# Patient Record
Sex: FEMALE | Race: WHITE | NOT HISPANIC OR LATINO | Employment: UNEMPLOYED | URBAN - NONMETROPOLITAN AREA
[De-identification: names, ages, dates, MRNs, and addresses within clinical notes are randomized per-mention and may not be internally consistent; named-entity substitution may affect disease eponyms.]

---

## 2017-01-09 ENCOUNTER — OFFICE VISIT (OUTPATIENT)
Dept: INTERNAL MEDICINE | Facility: CLINIC | Age: 32
End: 2017-01-09

## 2017-01-09 VITALS
OXYGEN SATURATION: 97 % | WEIGHT: 293 LBS | TEMPERATURE: 97.5 F | HEIGHT: 66 IN | SYSTOLIC BLOOD PRESSURE: 124 MMHG | RESPIRATION RATE: 16 BRPM | BODY MASS INDEX: 47.09 KG/M2 | HEART RATE: 84 BPM | DIASTOLIC BLOOD PRESSURE: 84 MMHG

## 2017-01-09 DIAGNOSIS — J06.9 ACUTE URI: Primary | ICD-10-CM

## 2017-01-09 LAB
EXPIRATION DATE: NORMAL
FLUAV AG NPH QL: NORMAL
FLUBV AG NPH QL: NORMAL
INTERNAL CONTROL: NORMAL
Lab: NORMAL

## 2017-01-09 PROCEDURE — 87804 INFLUENZA ASSAY W/OPTIC: CPT | Performed by: PHYSICIAN ASSISTANT

## 2017-01-09 PROCEDURE — 99213 OFFICE O/P EST LOW 20 MIN: CPT | Performed by: PHYSICIAN ASSISTANT

## 2017-01-09 RX ORDER — DEXTROMETHORPHAN HYDROBROMIDE AND PROMETHAZINE HYDROCHLORIDE 15; 6.25 MG/5ML; MG/5ML
SYRUP ORAL
Qty: 180 ML | Refills: 0 | Status: SHIPPED | OUTPATIENT
Start: 2017-01-09 | End: 2017-04-07

## 2017-01-09 NOTE — MR AVS SNAPSHOT
Emily Tracey   1/9/2017 5:15 PM   Office Visit    Provider:  WALI Shelton   Department:  [facility] PRIMARY [facility]ARE   Dept Phone:  [phone]                Your Full Care Plan              Today's Medication Changes          These changes are accurate as of: 1/9/17  5:39 PM.  If you have any questions, ask your nurse or doctor.               New Medication(s)Ordered:     promethazine-dextromethorphan 6.25-15 MG/5ML syrup   Commonly known as:  PROMETHAZINE-DM   Take 7.5 mL by mouth at bedtime as needed for cough.   Started by:  WALI Shelton            Where to Get Your Medications      These medications were sent to [facility]/pharmacy #[id number] - [place], [place] - [address] - [phone]  - [phone]   [address] [address]     Phone:  [phone]     promethazine-dextromethorphan 6.25-15 MG/5ML syrup                  Your Updated Medication List          This list is accurate as of: 1/9/17  5:39 PM.  Always use your most recent med list.                albuterol 108 (90 BASE) MCG/ACT inhaler   Commonly known as:  PROVENTIL HFA;VENTOLIN HFA       cyclobenzaprine 10 MG tablet   Commonly known as:  FLEXERIL       fluticasone-salmeterol 500-50 MCG/DOSE DISKUS   Commonly known as:  ADVAIR       loratadine 10 MG tablet   Commonly known as:  CLARITIN       LORazepam 1 MG tablet   Commonly known as:  ATIVAN       montelukast 10 MG tablet   Commonly known as:  SINGULAIR   Take 1 tablet by mouth Every Night.       ondansetron ODT 8 MG disintegrating tablet   Commonly known as:  ZOFRAN ODT   Take 1 tablet by mouth Every 8 (Eight) Hours As Needed for nausea or vomiting.       promethazine-dextromethorphan 6.25-15 MG/5ML syrup   Commonly known as:  PROMETHAZINE-DM   Take 7.5 mL by mouth at bedtime as needed for cough.               You Were Diagnosed With        Codes Comments    Acute URI    -  Primary ICD-10-CM: J06.9  ICD-9-CM: 465.9        "  Instructions     None    Patient Instructions History      [employer] Signup     Our records indicate that you have an active [employer] account.    You can view your After Visit Summary by going to [email or web address] and logging in with your [employer] username and password.  If you don't have a [employer] username and password but a parent or guardian has access to your record, the parent or guardian should login with their own [employer] username and password and access your record to view the After Visit Summary.    If you have questions, you can email [email or web address] or call [phone] to talk to our [employer] staff.  Remember, [employer] is NOT to be used for urgent needs.  For medical emergencies, dial 911.               Other Info from Your Visit           Allergies     No Known Allergies      Reason for Visit     Sore Throat onset over the wknd.     Cough productive    Chills     Ear Fullness           Vital Signs     Blood Pressure Pulse Temperature Respirations Height Weight    124/84 84 97.5 °F (36.4 °C) 16 66\" (167.6 cm) 310 lb 3 oz (141 kg)    Oxygen Saturation Body Mass Index Smoking Status             97% 50.07 kg/m2 Former Smoker         Problems and Diagnoses Noted     Acute upper respiratory infection    -  Primary      "

## 2017-01-09 NOTE — LETTER
January 9, 2017     Patient: Emily Tracey   YOB: 1985   Date of Visit: 1/9/2017       To Whom It May Concern:    It is my medical opinion that Emily Tracey may return to work on 1/11/2017.           Sincerely,        WALI Shelton    CC: No Recipients

## 2017-01-09 NOTE — PROGRESS NOTES
Emily Tracey is a 31 y.o. female.     Subjective   History of Present Illness   Here today with complaint of 2 days of body aches, feeling feverish, sore throat, cough, ear fullness, headaches and sinus pressure. Taking Dayquil which helped some with congestion but not with sore throat. Reports neck pain the first day but denies pain currently. Has had a little nausea. Cough productive for green sputum.         The following portions of the patient's history were reviewed and updated as appropriate: allergies, current medications, past family history, past medical history, past social history, past surgical history and problem list.    Review of Systems    Constitutional: fever, chills. Negative for appetite change, fatigue and unexpected weight change.   HENT: congestion, ear pain, postnasal drip, rhinorrhea, sore throat.  Negative for hearing loss, nosebleeds, tinnitus and trouble swallowing.    Eyes: Negative for photophobia, discharge and visual disturbance.   Respiratory: Cough.  Negative for chest tightness, shortness of breath and wheezing.    Cardiovascular: Negative for chest pain, palpitations and leg swelling.   Gastrointestinal: Nausea. Negative for abdominal distention, abdominal pain, blood in stool, constipation, diarrhea and vomiting.   Endocrine: Negative for cold intolerance, heat intolerance, polydipsia, polyphagia and polyuria.   Musculoskeletal: Neck pain. Negative for arthralgias, back pain, joint swelling, myalgias and neck stiffness.   Skin: Negative for color change, pallor, rash and wound.   Allergic/Immunologic: Negative for environmental allergies, food allergies and immunocompromised state.   Neurological: HA. Negative for dizziness, tremors, seizures, weakness, numbness.  Hematological: Negative for adenopathy. Does not bruise/bleed easily.   Psychiatric/Behavioral: Negative for agitation, behavioral problems, confusion, hallucinations, self-injury and suicidal ideas. The patient is  "not nervous/anxious.      Objective    Physical Exam  Constitutional: Oriented to person, place, and time. Appears well-developed and well-nourished.   HENT: OP mild erythema. Trace fluid bilateral ears.   Head: No sinus tenderness. Normocephalic and atraumatic.   Eyes: EOM are normal. Pupils are equal, round, and reactive to light.   Neck: Normal range of motion. Neck supple.   Cardiovascular: Normal rate, regular rhythm and normal heart sounds.    Pulmonary/Chest: Coarse breath sounds. Effort normal.  No respiratory distress.  Has no wheezes or rales. Exhibits no chest wall tenderness.   Abdominal: Soft. Bowel sounds are normal. Exhibits no distension and no mass. There is no tenderness.   Musculoskeletal: Normal range of motion. Exhibits no tenderness.   Neurological: Alert and oriented to person, place, and time.   Skin: Skin is warm and dry.   Psychiatric: Has a normal mood and affect. Behavior is normal. Judgment and thought content normal.       Visit Vitals   • /84   • Pulse 84   • Temp 97.5 °F (36.4 °C)   • Resp 16   • Ht 66\" (167.6 cm)   • Wt (!) 310 lb 3 oz (141 kg)   • SpO2 97%   • BMI 50.07 kg/m2       Nursing note and vitals reviewed.          Assessment/Plan   Emily was seen today for sore throat, cough, chills and ear fullness.    Diagnoses and all orders for this visit:    Acute URI  -     promethazine-dextromethorphan (PROMETHAZINE-DM) 6.25-15 MG/5ML syrup; Take 7.5 mL by mouth at bedtime as needed for cough.  Viral. Increase clear fluid intake.     Influenza A&B: negative.      "

## 2017-01-12 ENCOUNTER — TELEPHONE (OUTPATIENT)
Dept: INTERNAL MEDICINE | Facility: CLINIC | Age: 32
End: 2017-01-12

## 2017-01-12 ENCOUNTER — DOCUMENTATION (OUTPATIENT)
Dept: INTERNAL MEDICINE | Facility: CLINIC | Age: 32
End: 2017-01-12

## 2017-01-12 RX ORDER — AZITHROMYCIN 250 MG/1
TABLET, FILM COATED ORAL
Qty: 6 TABLET | Refills: 0 | Status: SHIPPED | OUTPATIENT
Start: 2017-01-12 | End: 2017-04-07

## 2017-01-12 NOTE — TELEPHONE ENCOUNTER
----- Message from Caitlyn Hussein sent at 1/12/2017  8:29 AM EST -----  Contact: Patient   Patient called the office checking the status on her extended excuse. She also was requesting to see if Tahira was wanting to do additional testing for pneumonia due her boyfriend recently being diagnosed with it. She stated that they are having similar symptoms.

## 2017-01-12 NOTE — TELEPHONE ENCOUNTER
----- Message from Shereen Alcantar sent at 1/11/2017  9:59 AM EST -----  Contact: PATIENT  Patient is still sick and is requesting a extension for her work excuse. She is requesting to be excused for Thursday 1/11/17.  Patient works for the [employer] in [place]. Patient would like a return call.

## 2017-01-12 NOTE — TELEPHONE ENCOUNTER
Notified pt. She stated she is a little worse and can't afford to come back in. Wants an antibiotic. Said I can leave a message on her phone if she doesn't answer. The excuse is to return today.

## 2017-04-07 ENCOUNTER — OFFICE VISIT (OUTPATIENT)
Dept: INTERNAL MEDICINE | Facility: CLINIC | Age: 32
End: 2017-04-07

## 2017-04-07 VITALS
WEIGHT: 293 LBS | RESPIRATION RATE: 16 BRPM | OXYGEN SATURATION: 98 % | SYSTOLIC BLOOD PRESSURE: 114 MMHG | HEIGHT: 66 IN | HEART RATE: 82 BPM | DIASTOLIC BLOOD PRESSURE: 80 MMHG | TEMPERATURE: 97.8 F | BODY MASS INDEX: 47.09 KG/M2

## 2017-04-07 DIAGNOSIS — F41.9 ANXIETY: Primary | ICD-10-CM

## 2017-04-07 DIAGNOSIS — R59.0 LYMPHADENOPATHY OF RIGHT CERVICAL REGION: ICD-10-CM

## 2017-04-07 DIAGNOSIS — F32.A DEPRESSION, UNSPECIFIED DEPRESSION TYPE: ICD-10-CM

## 2017-04-07 PROCEDURE — 99214 OFFICE O/P EST MOD 30 MIN: CPT | Performed by: NURSE PRACTITIONER

## 2017-04-07 RX ORDER — HYDROXYZINE PAMOATE 25 MG/1
25 CAPSULE ORAL NIGHTLY PRN
Qty: 14 CAPSULE | Refills: 0 | Status: SHIPPED | OUTPATIENT
Start: 2017-04-07 | End: 2017-04-21

## 2017-04-07 RX ORDER — BUPROPION HYDROCHLORIDE 150 MG/1
150 TABLET ORAL EVERY MORNING
Qty: 30 TABLET | Refills: 0 | Status: SHIPPED | OUTPATIENT
Start: 2017-04-07 | End: 2017-05-07

## 2017-04-07 RX ORDER — AZITHROMYCIN 250 MG/1
TABLET, FILM COATED ORAL
Qty: 6 TABLET | Refills: 0 | Status: SHIPPED | OUTPATIENT
Start: 2017-04-07 | End: 2017-05-26

## 2017-04-11 NOTE — PROGRESS NOTES
Chief Complaint / Reason:      Chief Complaint   Patient presents with   • Neck Pain     on the right side, up into ear. ? fevers. Took a 10 day coarse of Amox. 500 mg that she had at home.         Subjective   Presents today with right side neck pain that radiates up into her ear. She states she took a 10 day course of Amoxicillin 500 mg that she had at home. Fever is unknown but patient reports chills.    Neck Pain    This is a new problem. The current episode started in the past 7 days. The problem occurs constantly. The pain is associated with nothing. The pain is present in the right side. The quality of the pain is described as shooting and aching. The pain is at a severity of 4/10. The pain is mild. The symptoms are aggravated by swallowing and position. The pain is same all the time. Associated symptoms include a fever, headaches and pain with swallowing. Pertinent negatives include no numbness or visual change. Treatments tried: oral antibiotics. The treatment provided no relief.       History taken from: patient    PMH/FH/Social History were reviewed and updated appropriately in the electronic medical record.     Review of Systems:   Review of Systems   Constitutional: Positive for fever.   HENT: Positive for ear pain.    Respiratory: Negative.    Cardiovascular: Negative.    Gastrointestinal: Negative.    Musculoskeletal: Positive for neck pain.   Neurological: Positive for headaches. Negative for numbness.   Hematological: Positive for adenopathy.     All other systems were reviewed and are negative.  Exceptions are noted in the subjective or above.      Objective     Vital Signs  Vitals:    04/07/17 1543   BP: 114/80   Pulse: 82   Resp: 16   Temp: 97.8 °F (36.6 °C)   SpO2: 98%       Body mass index is 49.58 kg/(m^2).    Physical Exam   Constitutional: She is oriented to person, place, and time. She appears well-developed and well-nourished.   HENT:   Nose: Nose normal.   Mouth/Throat: Mucous membranes  are dry. Posterior oropharyngeal erythema present.   Neck: Normal range of motion.   Cardiovascular: Normal rate, regular rhythm, normal heart sounds and intact distal pulses.    Pulmonary/Chest: Effort normal and breath sounds normal.   Abdominal: Soft. Bowel sounds are normal.   Lymphadenopathy:     She has cervical adenopathy.        Right cervical: Superficial cervical adenopathy present.        Left cervical: Superficial cervical and posterior cervical adenopathy present.   Neurological: She is alert and oriented to person, place, and time.   Skin: Skin is warm and dry.   Nursing note and vitals reviewed.     Medication Review:     Current Outpatient Prescriptions:   •  albuterol (PROVENTIL HFA;VENTOLIN HFA) 108 (90 BASE) MCG/ACT inhaler, 2 puffs 4 (four) times a day as needed., Disp: , Rfl:   •  fluticasone-salmeterol (ADVAIR) 500-50 MCG/DOSE DISKUS, 1 puff 2 (two) times a day., Disp: , Rfl:   •  loratadine (CLARITIN) 10 MG tablet, Take  by mouth as needed., Disp: , Rfl:   •  LORazepam (ATIVAN) 1 MG tablet, Take  by mouth as needed., Disp: , Rfl:   •  montelukast (SINGULAIR) 10 MG tablet, Take 1 tablet by mouth Every Night., Disp: 30 tablet, Rfl: 5  •  ondansetron ODT (ZOFRAN ODT) 8 MG disintegrating tablet, Take 1 tablet by mouth Every 8 (Eight) Hours As Needed for nausea or vomiting., Disp: 12 tablet, Rfl: 2  •  azithromycin (ZITHROMAX Z-LILIAM) 250 MG tablet, Take 2 tablets the first day, then 1 tablet daily for 4 days., Disp: 6 tablet, Rfl: 0  •  buPROPion XL (WELLBUTRIN XL) 150 MG 24 hr tablet, Take 1 tablet by mouth Every Morning for 30 days., Disp: 30 tablet, Rfl: 0  •  hydrOXYzine (VISTARIL) 25 MG capsule, Take 1 capsule by mouth At Night As Needed for Anxiety for up to 14 days., Disp: 14 capsule, Rfl: 0    Assessment/Plan   Emily was seen today for neck pain.    Diagnoses and all orders for this visit:    Anxiety  -     hydrOXYzine (VISTARIL) 25 MG capsule; Take 1 capsule by mouth At Night As Needed for  Anxiety for up to 14 days.    Depression, unspecified depression type  -     buPROPion XL (WELLBUTRIN XL) 150 MG 24 hr tablet; Take 1 tablet by mouth Every Morning for 30 days.    Lymphadenopathy of right cervical region  -     azithromycin (ZITHROMAX Z-LILIAM) 250 MG tablet; Take 2 tablets the first day, then 1 tablet daily for 4 days.  Increase fluid intake and rest.  Take medications as prescribed and finish antibiotics.  Eat well balanced diet and exercise daily.   Good oral and hand hygiene.   Follow up in 4 weeks or sooner if symptoms worsen.    Kelly Dos Santos, GAYATHRI  04/07/2017

## 2017-04-14 PROBLEM — F41.0 PANIC ATTACKS: Status: ACTIVE | Noted: 2017-04-14

## 2017-04-14 PROBLEM — G43.909 MIGRAINE: Status: ACTIVE | Noted: 2017-04-14

## 2017-04-14 PROBLEM — N83.209 OVARIAN CYST: Status: ACTIVE | Noted: 2017-04-14

## 2017-04-14 PROBLEM — J45.909 ASTHMA: Status: ACTIVE | Noted: 2017-04-14

## 2017-05-19 ENCOUNTER — TELEPHONE (OUTPATIENT)
Dept: INTERNAL MEDICINE | Facility: CLINIC | Age: 32
End: 2017-05-19

## 2017-05-26 ENCOUNTER — OFFICE VISIT (OUTPATIENT)
Dept: INTERNAL MEDICINE | Facility: CLINIC | Age: 32
End: 2017-05-26

## 2017-05-26 VITALS
HEIGHT: 66 IN | OXYGEN SATURATION: 98 % | DIASTOLIC BLOOD PRESSURE: 86 MMHG | HEART RATE: 82 BPM | SYSTOLIC BLOOD PRESSURE: 122 MMHG | BODY MASS INDEX: 47.09 KG/M2 | RESPIRATION RATE: 16 BRPM | WEIGHT: 293 LBS | TEMPERATURE: 98.4 F

## 2017-05-26 DIAGNOSIS — F41.9 ANXIETY: ICD-10-CM

## 2017-05-26 DIAGNOSIS — R10.12 LEFT UPPER QUADRANT PAIN: Primary | ICD-10-CM

## 2017-05-26 DIAGNOSIS — F32.A DEPRESSION, UNSPECIFIED DEPRESSION TYPE: ICD-10-CM

## 2017-05-26 PROCEDURE — 99213 OFFICE O/P EST LOW 20 MIN: CPT | Performed by: NURSE PRACTITIONER

## 2017-05-26 RX ORDER — AMOXICILLIN AND CLAVULANATE POTASSIUM 500; 125 MG/1; MG/1
TABLET, FILM COATED ORAL 2 TIMES DAILY
COMMUNITY
Start: 2017-05-23 | End: 2019-11-11

## 2017-05-26 RX ORDER — BUPROPION HYDROCHLORIDE 150 MG/1
150 TABLET ORAL DAILY
Qty: 30 TABLET | Refills: 0 | Status: SHIPPED | OUTPATIENT
Start: 2017-05-26 | End: 2017-06-21 | Stop reason: SDUPTHER

## 2017-05-26 RX ORDER — LORAZEPAM 1 MG/1
TABLET ORAL
Qty: 30 TABLET | Refills: 0 | Status: SHIPPED | OUTPATIENT
Start: 2017-05-26 | End: 2019-12-12

## 2017-05-26 RX ORDER — HYDROXYZINE HYDROCHLORIDE 25 MG/1
25 TABLET, FILM COATED ORAL
COMMUNITY
End: 2017-05-26

## 2017-06-01 NOTE — TELEPHONE ENCOUNTER
Pharmacist from [facility] called and asked for Karissa's LORRAINE for the Ativan script. I told him that she does not have one, to put in Dr. Chavez's name.

## 2017-06-21 ENCOUNTER — TELEPHONE (OUTPATIENT)
Dept: INTERNAL MEDICINE | Facility: CLINIC | Age: 32
End: 2017-06-21

## 2017-06-21 DIAGNOSIS — F32.A DEPRESSION, UNSPECIFIED DEPRESSION TYPE: ICD-10-CM

## 2017-06-21 RX ORDER — BUPROPION HYDROCHLORIDE 150 MG/1
150 TABLET ORAL DAILY
Qty: 30 TABLET | Refills: 0 | Status: SHIPPED | OUTPATIENT
Start: 2017-06-21 | End: 2017-07-21

## 2017-06-21 NOTE — TELEPHONE ENCOUNTER
Patient cancelled appt on 6/23/2017 via Tele[employer] because she is between jobs, without insurance. She has rescheduled for 7/24/2017, hoping that insurance will be effective then. However, she will be out of the Wellbutrin and is requesting a refill.

## 2021-12-09 ENCOUNTER — HOSPITAL ENCOUNTER (EMERGENCY)
Facility: HOSPITAL | Age: 36
Discharge: HOME | End: 2021-12-09
Attending: EMERGENCY MEDICINE | Admitting: EMERGENCY MEDICINE

## 2021-12-09 VITALS
WEIGHT: 270 LBS | TEMPERATURE: 98.7 F | HEIGHT: 66 IN | OXYGEN SATURATION: 99 % | DIASTOLIC BLOOD PRESSURE: 92 MMHG | BODY MASS INDEX: 43.39 KG/M2 | HEART RATE: 80 BPM | RESPIRATION RATE: 16 BRPM | SYSTOLIC BLOOD PRESSURE: 137 MMHG

## 2021-12-09 DIAGNOSIS — R51.9 NONINTRACTABLE HEADACHE, UNSPECIFIED CHRONICITY PATTERN, UNSPECIFIED HEADACHE TYPE: Primary | ICD-10-CM

## 2021-12-09 PROCEDURE — 96375 TX/PRO/DX INJ NEW DRUG ADDON: CPT

## 2021-12-09 PROCEDURE — 25010000002 PROCHLORPERAZINE 10 MG/2ML SOLUTION: Performed by: PHYSICIAN ASSISTANT

## 2021-12-09 PROCEDURE — 96374 THER/PROPH/DIAG INJ IV PUSH: CPT

## 2021-12-09 PROCEDURE — 25010000002 DIPHENHYDRAMINE PER 50 MG: Performed by: PHYSICIAN ASSISTANT

## 2021-12-09 PROCEDURE — 99283 EMERGENCY DEPT VISIT LOW MDM: CPT

## 2021-12-09 PROCEDURE — 25010000002 KETOROLAC TROMETHAMINE PER 15 MG: Performed by: PHYSICIAN ASSISTANT

## 2021-12-09 RX ORDER — SODIUM CHLORIDE 0.9 % (FLUSH) 0.9 %
10 SYRINGE (ML) INJECTION AS NEEDED
Status: DISCONTINUED | OUTPATIENT
Start: 2021-12-09 | End: 2021-12-09 | Stop reason: HOSPADM

## 2021-12-09 RX ORDER — KETOROLAC TROMETHAMINE 30 MG/ML
30 INJECTION, SOLUTION INTRAMUSCULAR; INTRAVENOUS ONCE
Status: COMPLETED | OUTPATIENT
Start: 2021-12-09 | End: 2021-12-09

## 2021-12-09 RX ORDER — BUTALBITAL, ACETAMINOPHEN AND CAFFEINE 50; 325; 40 MG/1; MG/1; MG/1
2 TABLET ORAL ONCE
Status: COMPLETED | OUTPATIENT
Start: 2021-12-09 | End: 2021-12-09

## 2021-12-09 RX ORDER — PROCHLORPERAZINE EDISYLATE 5 MG/ML
10 INJECTION INTRAMUSCULAR; INTRAVENOUS ONCE
Status: COMPLETED | OUTPATIENT
Start: 2021-12-09 | End: 2021-12-09

## 2021-12-09 RX ORDER — DIPHENHYDRAMINE HYDROCHLORIDE 50 MG/ML
25 INJECTION INTRAMUSCULAR; INTRAVENOUS ONCE
Status: COMPLETED | OUTPATIENT
Start: 2021-12-09 | End: 2021-12-09

## 2021-12-09 RX ADMIN — DIPHENHYDRAMINE HYDROCHLORIDE 25 MG: 50 INJECTION INTRAMUSCULAR; INTRAVENOUS at 15:41

## 2021-12-09 RX ADMIN — PROCHLORPERAZINE EDISYLATE 10 MG: 5 INJECTION INTRAMUSCULAR; INTRAVENOUS at 15:42

## 2021-12-09 RX ADMIN — SODIUM CHLORIDE 1000 ML: 9 INJECTION, SOLUTION INTRAVENOUS at 15:40

## 2021-12-09 RX ADMIN — BUTALBITAL, ACETAMINOPHEN AND CAFFEINE 2 TABLET: 50; 325; 40 TABLET ORAL at 17:50

## 2021-12-09 RX ADMIN — KETOROLAC TROMETHAMINE 30 MG: 30 INJECTION, SOLUTION INTRAMUSCULAR; INTRAVENOUS at 15:40

## 2021-12-09 NOTE — ED PROVIDER NOTES
Subjective   Chief Complaint: Headache  History of Present Illness: 36-year-old female comes in for evaluation above complaint.  She states she has history of migraines.  She tried NyQuil and Tylenol without relief.  Restart upon awakening this morning around 7:00.  She is on day 8 or 9 of 7 URI symptoms to include sinus congestion fatigue and also some constipation.  She was seen by urgent treatment a day or 2 ago and had a negative flu and Covid swab was put on amoxicillin.  No vomiting but mild nausea.  She states her chief complaint only reason she is here today is for treatment of her headache  Onset: This morning upon awakening around 6 or 7:00  Timing: Ongoing  Exacerbating / Alleviating factors: Worse with bright lights  Associated symptoms: None      Nurses Notes reviewed and agree, including vitals, allergies, social history and prior medical history.          Review of Systems   Constitutional: Positive for fatigue.   HENT: Positive for congestion.    Eyes: Negative.    Respiratory: Negative.  Negative for cough.    Cardiovascular: Negative.    Gastrointestinal: Positive for constipation.   Genitourinary: Negative.    Musculoskeletal: Negative.    Skin: Negative.    Neurological: Positive for headaches.   Psychiatric/Behavioral: Negative.        Past Medical History:   Diagnosis Date   • Abdominal pain    • Anxiety    • Asthma    • Backache    • Blood in stool    • Depression 2001   • Diverticulitis of colon    • Edema    • Kidney infection    • Low back pain    • LUQ pain    • Migraine headache    • Ovarian cyst    • Pain and swelling of right lower leg    • Panic attacks    • Sacroiliac joint pain    • Suspicious nevus    • Tattoo    • Vaginitis        No Known Allergies    Past Surgical History:   Procedure Laterality Date   • COLONOSCOPY     • DENTAL PROCEDURE     • OTHER SURGICAL HISTORY  2001    Laparoscopic excision uterine surface endometriotic tissue       Family History   Problem Relation Age of  Onset   • Heart attack Other         Grandparent   • Arthritis Other         Grandparent   • Cancer Other         Grandparent   • Diabetes Other         Grandparent   • Migraines Other         Grandparent   • Osteoporosis Other    • Colon cancer Neg Hx        Social History     Socioeconomic History   • Marital status: Single   Tobacco Use   • Smoking status: Former Smoker   • Smokeless tobacco: Never Used   • Tobacco comment: Quit in 2013   Substance and Sexual Activity   • Alcohol use: Yes     Comment: occas.   • Drug use: No   • Sexual activity: Defer           Objective   Physical Exam  Vitals and nursing note reviewed.   Constitutional:       General: She is not in acute distress.     Appearance: Normal appearance. She is obese. She is not ill-appearing, toxic-appearing or diaphoretic.   HENT:      Head: Normocephalic and atraumatic.      Nose: Nose normal.   Eyes:      Extraocular Movements: Extraocular movements intact.   Cardiovascular:      Rate and Rhythm: Normal rate and regular rhythm.      Pulses: Normal pulses.   Pulmonary:      Effort: Pulmonary effort is normal.   Abdominal:      General: Abdomen is flat.   Musculoskeletal:         General: Normal range of motion.      Cervical back: Normal range of motion. No rigidity.   Skin:     General: Skin is warm and dry.   Neurological:      General: No focal deficit present.      Mental Status: She is alert. Mental status is at baseline.   Psychiatric:         Mood and Affect: Mood normal.         Behavior: Behavior normal.         Procedures           ED Course                                                 MDM  Patient states headache is much improved.  Upon arrival she states it was greater than a 10 out of 10, currently it is a 5 out of 10.  Final diagnoses:   Nonintractable headache, unspecified chronicity pattern, unspecified headache type       ED Disposition  ED Disposition     ED Disposition Condition Comment    Discharge Stable           Your  Family Doctor      As needed    [facility] Emergency Department  [address] [address] [phone]    If symptoms worsen         Medication List      No changes were made to your prescriptions during this visit.          Lavelle Lara PA-C  12/09/21 [id number]

## 2022-05-02 ENCOUNTER — TRANSCRIBE ORDERS (OUTPATIENT)
Dept: ADMINISTRATIVE | Facility: HOSPITAL | Age: 37
End: 2022-05-02

## 2022-05-02 DIAGNOSIS — E04.1 LEFT THYROID NODULE: Primary | ICD-10-CM

## 2022-05-18 ENCOUNTER — HOSPITAL ENCOUNTER (OUTPATIENT)
Dept: ULTRASOUND IMAGING | Facility: HOSPITAL | Age: 37
Discharge: HOME | End: 2022-05-18
Admitting: NURSE PRACTITIONER

## 2022-05-18 DIAGNOSIS — E04.1 LEFT THYROID NODULE: ICD-10-CM

## 2022-05-18 PROCEDURE — 76536 US EXAM OF HEAD AND NECK: CPT

## 2022-05-23 ENCOUNTER — HOSPITAL ENCOUNTER (EMERGENCY)
Facility: HOSPITAL | Age: 37
Discharge: LEFT BEFORE DISCHARGE | End: 2022-05-23

## 2022-05-23 VITALS
DIASTOLIC BLOOD PRESSURE: 99 MMHG | HEART RATE: 80 BPM | BODY MASS INDEX: 42.43 KG/M2 | TEMPERATURE: 97.9 F | RESPIRATION RATE: 16 BRPM | HEIGHT: 66 IN | OXYGEN SATURATION: 98 % | WEIGHT: 264 LBS | SYSTOLIC BLOOD PRESSURE: 153 MMHG

## 2022-05-23 PROCEDURE — 99211 OFF/OP EST MAY X REQ PHY/QHP: CPT

## 2022-05-31 ENCOUNTER — OFFICE VISIT (OUTPATIENT)
Dept: GASTROENTEROLOGY | Facility: CLINIC | Age: 37
End: 2022-05-31

## 2022-05-31 VITALS
DIASTOLIC BLOOD PRESSURE: 80 MMHG | HEART RATE: 66 BPM | WEIGHT: 262.6 LBS | SYSTOLIC BLOOD PRESSURE: 128 MMHG | OXYGEN SATURATION: 97 % | TEMPERATURE: 95.5 F | HEIGHT: 66 IN | BODY MASS INDEX: 42.2 KG/M2

## 2022-05-31 DIAGNOSIS — Z01.812 PRE-PROCEDURE LAB EXAM: ICD-10-CM

## 2022-05-31 DIAGNOSIS — R13.19 ESOPHAGEAL DYSPHAGIA: ICD-10-CM

## 2022-05-31 DIAGNOSIS — K31.84 GASTROPARESIS: ICD-10-CM

## 2022-05-31 DIAGNOSIS — H35.012 RETINAL MACROANEURYSM, LEFT EYE: Primary | ICD-10-CM

## 2022-05-31 DIAGNOSIS — R19.4 CHANGE IN BOWEL HABITS: ICD-10-CM

## 2022-05-31 DIAGNOSIS — K21.9 GASTROESOPHAGEAL REFLUX DISEASE, UNSPECIFIED WHETHER ESOPHAGITIS PRESENT: ICD-10-CM

## 2022-05-31 DIAGNOSIS — R63.4 WEIGHT LOSS, ABNORMAL: ICD-10-CM

## 2022-05-31 DIAGNOSIS — R10.12 LUQ ABDOMINAL PAIN: Primary | ICD-10-CM

## 2022-05-31 DIAGNOSIS — R11.0 NAUSEA: ICD-10-CM

## 2022-05-31 DIAGNOSIS — K62.5 RECTAL BLEEDING: ICD-10-CM

## 2022-05-31 PROCEDURE — 99244 OFF/OP CNSLTJ NEW/EST MOD 40: CPT | Performed by: PHYSICIAN ASSISTANT

## 2022-05-31 RX ORDER — ONDANSETRON 4 MG/1
4 TABLET, ORALLY DISINTEGRATING ORAL EVERY 8 HOURS PRN
Qty: 42 TABLET | Refills: 3 | Status: SHIPPED | OUTPATIENT
Start: 2022-05-31 | End: 2023-03-13

## 2022-05-31 RX ORDER — FAMOTIDINE 20 MG/1
20 TABLET, FILM COATED ORAL AS NEEDED
COMMUNITY
End: 2022-06-14 | Stop reason: HOSPADM

## 2022-05-31 RX ORDER — SODIUM CHLORIDE 9 MG/ML
30 INJECTION, SOLUTION INTRAVENOUS CONTINUOUS PRN
Status: CANCELLED | OUTPATIENT
Start: 2022-05-31

## 2022-05-31 RX ORDER — BISACODYL 5 MG
TABLET, DELAYED RELEASE (ENTERIC COATED) ORAL
Qty: 4 TABLET | Refills: 0 | Status: SHIPPED | OUTPATIENT
Start: 2022-05-31 | End: 2022-07-27

## 2022-05-31 RX ORDER — POLYETHYLENE GLYCOL 3350 17 G/17G
POWDER, FOR SOLUTION ORAL
Qty: 238 G | Refills: 0 | Status: SHIPPED | OUTPATIENT
Start: 2022-05-31 | End: 2022-10-20

## 2022-05-31 RX ORDER — CYANOCOBALAMIN 1000 UG/ML
1000 INJECTION, SOLUTION INTRAMUSCULAR; SUBCUTANEOUS WEEKLY
COMMUNITY
End: 2022-10-20

## 2022-05-31 RX ORDER — CYCLOBENZAPRINE HCL 5 MG
TABLET ORAL
COMMUNITY
Start: 2022-04-06 | End: 2022-06-14 | Stop reason: HOSPADM

## 2022-05-31 RX ORDER — ERGOCALCIFEROL 1.25 MG/1
50000 CAPSULE ORAL
COMMUNITY
Start: 2022-05-02 | End: 2022-08-11

## 2022-06-03 ENCOUNTER — TELEPHONE (OUTPATIENT)
Dept: GASTROENTEROLOGY | Facility: CLINIC | Age: 37
End: 2022-06-03

## 2022-06-03 ENCOUNTER — PATIENT ROUNDING (BHMG ONLY) (OUTPATIENT)
Dept: GASTROENTEROLOGY | Facility: CLINIC | Age: 37
End: 2022-06-03

## 2022-06-03 PROBLEM — R13.19 ESOPHAGEAL DYSPHAGIA: Status: ACTIVE | Noted: 2022-06-03

## 2022-06-03 PROBLEM — R19.4 CHANGE IN BOWEL HABITS: Status: ACTIVE | Noted: 2022-06-03

## 2022-06-03 NOTE — TELEPHONE ENCOUNTER
Patient left message with supervisor stating that she had some questions regarding diet and wished to speak to medical assistant. Called patient to discuss. Reached voicemail. Left detailed message for return call.

## 2022-06-03 NOTE — PROGRESS NOTES
Gemma 3, 2022    Hello, may I speak with Emily Tracey?    My name is Irma     I am  with [facility]  [address]  [facility] [address] [phone].    Before we get started may I verify your date of birth? 1985    I am calling to officially welcome you to our practice and ask about your recent visit. Is this a good time to talk? yes    Tell me about your visit with us. What things went well?  everything went like expected       We're always looking for ways to make our patients' experiences even better. Do you have recommendations on ways we may improve?  no    Overall were you satisfied with your first visit to our practice? yes       I appreciate you taking the time to speak with me today. Is there anything else I can do for you? Yes, I have some questions re: diet. I will ask MA to call patient to explain today.      Thank you, and have a great day.

## 2022-06-13 ENCOUNTER — LAB (OUTPATIENT)
Dept: LAB | Facility: HOSPITAL | Age: 37
End: 2022-06-13

## 2022-06-13 LAB — SARS-COV-2 RNA PNL SPEC NAA+PROBE: NOT DETECTED

## 2022-06-13 PROCEDURE — 87635 SARS-COV-2 COVID-19 AMP PRB: CPT

## 2022-06-13 NOTE — PRE-PROCEDURE INSTRUCTIONS
PAT phone history completed with pt for upcoming procedure on  6/14/22 with Dr. Howell.    PAT PASS GIVEN/REVIEWED WITH PT.  VERBALIZED UNDERSTANDING OF THE FOLLOWING:  DO NOT EAT, DRINK, SMOKE, USE SMOKELESS TOBACCO OR CHEW GUM AFTER MIDNIGHT THE NIGHT BEFORE SURGERY.  THIS ALSO INCLUDES HARD CANDIES AND MINTS.    DO NOT SHAVE THE AREA TO BE OPERATED ON AT LEAST 48 HOURS PRIOR TO THE PROCEDURE.  DO NOT WEAR MAKE UP OR NAIL POLISH.  DO NOT LEAVE IN ANY PIERCING OR WEAR JEWELRY THE DAY OF SURGERY.      DO NOT USE ADHESIVES IF YOU WEAR DENTURES.    DO NOT WEAR EYE CONTACTS; BRING IN YOUR GLASSES.    ONLY TAKE MEDICATION THE MORNING OF YOUR PROCEDURE IF INSTRUCTED BY YOUR SURGEON WITH ENOUGH WATER TO SWALLOW THE MEDICATION.  IF YOUR SURGEON DID NOT SPECIFY WHICH MEDICATIONS TO TAKE, YOU WILL NEED TO CALL THEIR OFFICE FOR FURTHER INSTRUCTIONS AND DO AS THEY INSTRUCT.    LEAVE ANYTHING YOU CONSIDER VALUABLE AT HOME.    YOU WILL NEED TO ARRANGE FOR SOMEONE TO DRIVE YOU HOME AFTER SURGERY.  IT IS RECOMMENDED THAT YOU DO NOT DRIVE, WORK, DRINK ALCOHOL OR MAKE MAJOR DECISIONS FOR AT LEAST 24 HOURS AFTER YOUR PROCEDURE IS COMPLETE.      THE DAY OF YOUR PROCEDURE, BRING IN THE FOLLOWING IF APPLICABLE:   PICTURE ID AND INSURANCE/MEDICARE OR MEDICAID CARDS/ANY CO-PAY THAT MAY BE DUE   COPY OF ADVANCED DIRECTIVE/LIVING WILL/POWER OR    CPAP/BIPAP/INHALERS   SKIN PREP SHEET   YOUR PREADMISSION TESTING PASS (IF NOT A PHONE HISTORY)           COVID self-quarantine instructions reviewed with the pt.  Verbalized understanding.

## 2022-06-14 ENCOUNTER — ANESTHESIA EVENT (OUTPATIENT)
Dept: GASTROENTEROLOGY | Facility: HOSPITAL | Age: 37
End: 2022-06-14

## 2022-06-14 ENCOUNTER — HOSPITAL ENCOUNTER (OUTPATIENT)
Facility: HOSPITAL | Age: 37
Setting detail: HOSPITAL OUTPATIENT SURGERY
Discharge: HOME | End: 2022-06-14
Attending: INTERNAL MEDICINE | Admitting: INTERNAL MEDICINE

## 2022-06-14 ENCOUNTER — ANESTHESIA (OUTPATIENT)
Dept: GASTROENTEROLOGY | Facility: HOSPITAL | Age: 37
End: 2022-06-14

## 2022-06-14 VITALS
DIASTOLIC BLOOD PRESSURE: 73 MMHG | RESPIRATION RATE: 18 BRPM | TEMPERATURE: 97.7 F | OXYGEN SATURATION: 96 % | SYSTOLIC BLOOD PRESSURE: 134 MMHG | HEART RATE: 70 BPM

## 2022-06-14 DIAGNOSIS — K21.9 GASTROESOPHAGEAL REFLUX DISEASE, UNSPECIFIED WHETHER ESOPHAGITIS PRESENT: ICD-10-CM

## 2022-06-14 DIAGNOSIS — R13.19 ESOPHAGEAL DYSPHAGIA: ICD-10-CM

## 2022-06-14 LAB
B-HCG UR QL: NEGATIVE
EXPIRATION DATE: NORMAL
INTERNAL NEGATIVE CONTROL: NORMAL
INTERNAL POSITIVE CONTROL: NORMAL
Lab: NORMAL

## 2022-06-14 PROCEDURE — 25010000002 PROPOFOL 10 MG/ML EMULSION: Performed by: NURSE ANESTHETIST, CERTIFIED REGISTERED

## 2022-06-14 PROCEDURE — 43239 EGD BIOPSY SINGLE/MULTIPLE: CPT | Performed by: INTERNAL MEDICINE

## 2022-06-14 PROCEDURE — 25010000002 MIDAZOLAM PER 1MG: Performed by: NURSE ANESTHETIST, CERTIFIED REGISTERED

## 2022-06-14 PROCEDURE — 81025 URINE PREGNANCY TEST: CPT | Performed by: INTERNAL MEDICINE

## 2022-06-14 RX ORDER — LIDOCAINE HYDROCHLORIDE 20 MG/ML
INJECTION, SOLUTION INTRAVENOUS AS NEEDED
Status: DISCONTINUED | OUTPATIENT
Start: 2022-06-14 | End: 2022-06-14 | Stop reason: SURG

## 2022-06-14 RX ORDER — PROPOFOL 10 MG/ML
VIAL (ML) INTRAVENOUS AS NEEDED
Status: DISCONTINUED | OUTPATIENT
Start: 2022-06-14 | End: 2022-06-14 | Stop reason: SURG

## 2022-06-14 RX ORDER — SODIUM CHLORIDE 9 MG/ML
30 INJECTION, SOLUTION INTRAVENOUS CONTINUOUS PRN
Status: DISCONTINUED | OUTPATIENT
Start: 2022-06-14 | End: 2022-06-14 | Stop reason: HOSPADM

## 2022-06-14 RX ORDER — PANTOPRAZOLE SODIUM 20 MG/1
20 TABLET, DELAYED RELEASE ORAL DAILY
Qty: 30 TABLET | Refills: 2 | Status: SHIPPED | OUTPATIENT
Start: 2022-06-14 | End: 2022-07-14

## 2022-06-14 RX ORDER — MIDAZOLAM HYDROCHLORIDE 2 MG/2ML
INJECTION, SOLUTION INTRAMUSCULAR; INTRAVENOUS AS NEEDED
Status: DISCONTINUED | OUTPATIENT
Start: 2022-06-14 | End: 2022-06-14 | Stop reason: SURG

## 2022-06-14 RX ADMIN — PROPOFOL 100 MG: 10 INJECTION, EMULSION INTRAVENOUS at 13:20

## 2022-06-14 RX ADMIN — LIDOCAINE HYDROCHLORIDE 60 MG: 20 INJECTION, SOLUTION INTRAVENOUS at 13:20

## 2022-06-14 RX ADMIN — SODIUM CHLORIDE 30 ML/HR: 9 INJECTION, SOLUTION INTRAVENOUS at 11:47

## 2022-06-14 RX ADMIN — MIDAZOLAM HYDROCHLORIDE 2 MG: 1 INJECTION, SOLUTION INTRAMUSCULAR; INTRAVENOUS at 13:20

## 2022-06-14 RX ADMIN — PROPOFOL 200 MCG/KG/MIN: 10 INJECTION, EMULSION INTRAVENOUS at 13:20

## 2022-06-14 NOTE — ANESTHESIA POSTPROCEDURE EVALUATION
Patient: Emily Tracey    Procedure Summary     Date: 06/14/22 Room / Location: [facility] ENDOSCOPY 2 / [facility] ENDOSCOPY    Anesthesia Start: 1316 Anesthesia Stop: 1338    Procedure: ESOPHAGOGASTRODUODENOSCOPY WITH BIOPSY (N/A Esophagus) Diagnosis:       Esophageal dysphagia      Gastroesophageal reflux disease, unspecified whether esophagitis present      (Esophageal dysphagia [R13.19])      (Gastroesophageal reflux disease, unspecified whether esophagitis present [K21.9])    Surgeons: Johnna Howell MD Provider: Chema Gonzalez CRNA    Anesthesia Type: MAC ASA Status: 3          Anesthesia Type: MAC    Vitals  No vitals data found for the desired time range.          Post Anesthesia Care and Evaluation    Patient location during evaluation: PHASE II  Patient participation: complete - patient participated  Level of consciousness: awake and alert  Pain score: 0  Pain management: satisfactory to patient    Airway patency: patent  Anesthetic complications: No anesthetic complications  PONV Status: none  Cardiovascular status: acceptable and stable  Respiratory status: acceptable  Hydration status: acceptable    Comments: Vitals signs as noted in nursing documentation as per protocol.

## 2022-06-14 NOTE — DISCHARGE INSTRUCTIONS
- Discharge patient to home (ambulatory).   - Low fiber small meals  - Continue present medications.   - Avoid NSAIDS  - longterm PPI  - Dietary changes  and Lifestyle changes including losing weight  - Await pathology results.   - Repeat EGD 2-3 yrs pending path  - Return to my office in 8 months.     Please follow all post op instructions and follow up appointment time from your physician's office included in your discharge packet.    REST TODAY    No pushing, pulling, tugging,  heavy lifting, or strenuous activity.  No major decision making, driving, or drinking alcoholic beverages for 24 hours. ( due to the medications you have  received)  Always use good hand hygiene/washing techniques.  NO driving while taking pain medications.    To assist you in voiding:  Drink plenty of fluids  Listen to running water while attempting to void.    If you are unable to urinate and you have an uncomfortable urge to void or it has been   6 hours since you were discharged, return to the Emergency Room

## 2022-06-14 NOTE — H&P
[facility]  HISTORY AND PHYSICAL    Patient Name: Emily Tracey  : 1985  MRN: [id number]    Chief Complaint:   For EGD    History Of Presenting Illness:    Nausea  LUQ abdominal pain   Change in bowel habit  Wt loss      Past Medical History:   Diagnosis Date   • Abdominal pain    • Anxiety    • Asthma    • Backache    • Blood in stool    • Colon polyp    • COVID-19 vaccine series completed     with booster   • Depression    • Diverticulitis of colon    • Edema    • GERD (gastroesophageal reflux disease)    • Kidney infection    • Low back pain    • LUQ pain    • Migraine headache    • Ovarian cyst    • Pain and swelling of right lower leg    • Panic attacks    • Sacroiliac joint pain    • Suspicious nevus    • Tattoo    • Urgency of urination    • Vaginitis    • Wears glasses        Past Surgical History:   Procedure Laterality Date   • COLONOSCOPY     • OTHER SURGICAL HISTORY      Laparoscopic excision uterine surface endometriotic tissue x2   • WISDOM TOOTH EXTRACTION         Social History     Socioeconomic History   • Marital status:    Tobacco Use   • Smoking status: Former Smoker   • Smokeless tobacco: Never Used   • Tobacco comment: Quit in    Vaping Use   • Vaping Use: Never used   Substance and Sexual Activity   • Alcohol use: Yes     Alcohol/week: 1.0 standard drink     Types: 1 Glasses of wine per week     Comment: occas.   • Drug use: No   • Sexual activity: Defer       Family History   Problem Relation Age of Onset   • Cancer Mother    • COPD Mother    • Heart attack Father    • Esophageal cancer Maternal Uncle    • Heart attack Other         Grandparent   • Arthritis Other         Grandparent   • Cancer Other         Grandparent   • Diabetes Other         Grandparent   • Migraines Other         Grandparent   • Osteoporosis Other    • Colon cancer Neg Hx        Prior to Admission Medications:  Medications Prior to Admission   Medication Sig Dispense Refill  Last Dose   • acetaminophen (TYLENOL) 500 MG tablet Take 500 mg by mouth Every 6 (Six) Hours As Needed for Mild Pain .   6/14/2022 at 0100   • albuterol (PROVENTIL HFA;VENTOLIN HFA) 108 (90 BASE) MCG/ACT inhaler 2 puffs 4 (four) times a day as needed.   Past Week at Unknown time   • cyanocobalamin 1000 MCG/ML injection Inject 1,000 mcg into the appropriate muscle as directed by prescriber 1 (One) Time Per Week. Every other week   Past Week at Unknown time   • diphenhydrAMINE (BENADRYL) 25 MG tablet Take 25 mg by mouth Every 6 (Six) Hours As Needed for Itching.   Past Month at Unknown time   • famotidine (PEPCID) 20 MG tablet Take 20 mg by mouth As Needed for Heartburn.   6/13/2022 at 0900   • fluticasone-salmeterol (ADVAIR) 500-50 MCG/DOSE DISKUS 1 puff 2 (two) times a day.   Past Week at Unknown time   • ondansetron ODT (ZOFRAN-ODT) 4 MG disintegrating tablet Place 1 tablet on the tongue Every 8 (Eight) Hours As Needed for Nausea or Vomiting. 42 tablet 3 Past Week at Unknown time   • vitamin D (ERGOCALCIFEROL) 1.25 MG (69737 UT) capsule capsule Take 50,000 Units by mouth Every 7 (Seven) Days.     Past Week at Unknown time   • bisacodyl (Dulcolax) 5 MG EC tablet Follow instructions given at office 4 tablet 0    • cyclobenzaprine (FLEXERIL) 5 MG tablet TAKE 1 TABLET BY MOUTH THREE TIMES A DAY AS NEEDED FOR 30 DAYS (Patient not taking: No sig reported)   Not Taking at Unknown time   • polyethylene glycol (MIRALAX) 17 GM/SCOOP powder Follow directions given at office 238 g 0        Allergies:  Allergies   Allergen Reactions   • Hydroxyzine Hives   • Lamotrigine Rash        Vitals: Temp:  [97.7 °F (36.5 °C)] 97.7 °F (36.5 °C)  Heart Rate:  [73] 73  Resp:  [18] 18  BP: (146)/(100) 146/100    Review Of Systems:  Constitutional:  Negative for chills, fever, and unexpected weight change.  Respiratory:  Negative for cough, chest tightness, shortness of breath, and wheezing.  Cardiovascular:  Negative for chest pain,  palpitations, and leg swelling.  Gastrointestinal:  Negative for abdominal distention, abdominal pain, Nausea, vomiting.  Neurological:  Negative for Weakness, numbness, and headaches.     Physical Exam:    General Appearance:  Alert, cooperative, in no acute distress.   Lungs:   Clear to auscultation, respirations regular, even and                 unlabored.   Heart:  Regular rhythm and normal rate.   Abdomen:   Normal bowel sounds, no masses, no organomegaly. Soft, non-tender, non-distended   Neurologic: Alert and oriented x 3. Moves all four limbs equally       Plan: ESOPHAGOGASTRODUODENOSCOPY WITH DILATATION CPT CODE: 31074 (N/A)     Johnna Howell MD  6/14/2022

## 2022-06-14 NOTE — ANESTHESIA PREPROCEDURE EVALUATION
Anesthesia Evaluation     Patient summary reviewed and Nursing notes reviewed   no history of anesthetic complications:  NPO Solid Status: > 8 hours  NPO Liquid Status: > 8 hours           Airway   Mallampati: I  TM distance: >3 FB  Neck ROM: full  no difficulty expected  Dental - normal exam     Pulmonary - normal exam   (+) asthma,  Cardiovascular - negative cardio ROS and normal exam        Neuro/Psych  (+) headaches, numbness, psychiatric history,    GI/Hepatic/Renal/Endo    (+)  GERD,  renal disease,     Musculoskeletal (-) negative ROS    Abdominal    Substance History - negative use     OB/GYN negative ob/gyn ROS         Other - negative ROS                       Anesthesia Plan    ASA 3     MAC     intravenous induction     Anesthetic plan, risks, benefits, and alternatives have been provided, discussed and informed consent has been obtained with: patient.        CODE STATUS:

## 2022-06-16 LAB — REF LAB TEST METHOD: NORMAL

## 2022-07-15 DIAGNOSIS — N93.9 VAGINAL BLEEDING: Primary | ICD-10-CM

## 2022-07-15 LAB
B-HCG UR QL: NEGATIVE
EXPIRATION DATE: NORMAL
INTERNAL NEGATIVE CONTROL: NEGATIVE
INTERNAL POSITIVE CONTROL: POSITIVE
Lab: NORMAL

## 2022-07-15 PROCEDURE — 81025 URINE PREGNANCY TEST: CPT | Performed by: OBSTETRICS & GYNECOLOGY

## 2022-07-27 ENCOUNTER — OFFICE VISIT (OUTPATIENT)
Dept: OBSTETRICS AND GYNECOLOGY | Facility: CLINIC | Age: 37
End: 2022-07-27

## 2022-07-27 VITALS
DIASTOLIC BLOOD PRESSURE: 82 MMHG | HEIGHT: 66 IN | BODY MASS INDEX: 42.11 KG/M2 | WEIGHT: 262 LBS | SYSTOLIC BLOOD PRESSURE: 128 MMHG

## 2022-07-27 DIAGNOSIS — N92.1 MENORRHAGIA WITH IRREGULAR CYCLE: Primary | ICD-10-CM

## 2022-07-27 DIAGNOSIS — N95.1 MENOPAUSAL SYMPTOMS: ICD-10-CM

## 2022-07-27 DIAGNOSIS — Z87.42 PERSONAL HISTORY OF ENDOMETRIOSIS: ICD-10-CM

## 2022-07-27 DIAGNOSIS — N94.10 DYSPAREUNIA IN FEMALE: ICD-10-CM

## 2022-07-27 DIAGNOSIS — R10.2 PELVIC PAIN: ICD-10-CM

## 2022-07-27 DIAGNOSIS — N83.202 LEFT OVARIAN CYST: ICD-10-CM

## 2022-07-27 DIAGNOSIS — N94.6 DYSMENORRHEA: ICD-10-CM

## 2022-07-27 PROCEDURE — 99204 OFFICE O/P NEW MOD 45 MIN: CPT | Performed by: OBSTETRICS & GYNECOLOGY

## 2022-07-29 NOTE — PROGRESS NOTES
Chief Complaint  Menorrhagia (C/o of irregular periods, decidual cast, heavy bleeding , twitching uterus )     History of Present Illness:  Patient is 37 y.o. No obstetric history on file. who presents to [facility] here as a new patient for evaluation of her heavy and irregular menstrual cycles.  Patient reports her menstrual cycles have been irregular over the last several months.  Patient reports in May she was 1 week late on her menstrual cycle.  Patient started spotting on the 20th.  She reports her bleeding became heavier on the 23rd.  Patient was seen in the emergency room on the 24th.  Patient reports passing tissue which appeared to be a decidual cast.  Patient has a picture on her phone.  Patient was seen in the emergency room.  Patient did have labs as well.  Patient reports bleeding again on June 19.  Patient reports normally her menstrual cycles will last for a week.  Patient had a transvaginal ultrasound on July 15.  Those images are reviewed today with the patient.  She reports bleeding again on July 20 through 25.  Patient has been having pelvic pain as well as dysmenorrhea.  She also reports having pain with intercourse.  Patient has been having menopausal symptoms as well.  She reports her mother went through menopause at the age of 40.  Patient does give a history of having endometriosis as well.  Patient is not on any contraception.  Patient does not want to be on any contraception.  Patient is desiring conception.  Patient does report having recent labs with her primary care provider.  She reports her B12 and vitamin D were low.    History  Past Medical History:   Diagnosis Date   • Abdominal pain    • Anxiety    • Asthma    • Backache    • Blood in stool    • Colon polyp    • COVID-19 vaccine series completed     with booster   • Depression 2001   • Diverticulitis of colon    • Edema    • Endometriosis    • GERD (gastroesophageal reflux disease)    • Kidney infection     • Low back pain    • LUQ pain    • Migraine headache    • Ovarian cyst    • Pain and swelling of right lower leg    • Panic attacks    • Sacroiliac joint pain    • Suspicious nevus    • Tattoo    • Urgency of urination    • Vaginitis    • Wears glasses      Current Outpatient Medications on File Prior to Visit   Medication Sig Dispense Refill   • albuterol (PROVENTIL HFA;VENTOLIN HFA) 108 (90 BASE) MCG/ACT inhaler 2 puffs 4 (four) times a day as needed.     • cyanocobalamin 1000 MCG/ML injection Inject 1,000 mcg into the appropriate muscle as directed by prescriber 1 (One) Time Per Week. Every other week     • fluticasone-salmeterol (ADVAIR) 500-50 MCG/DOSE DISKUS 1 puff 2 (two) times a day.     • ondansetron ODT (ZOFRAN-ODT) 4 MG disintegrating tablet Place 1 tablet on the tongue Every 8 (Eight) Hours As Needed for Nausea or Vomiting. 42 tablet 3   • polyethylene glycol (MIRALAX) 17 GM/SCOOP powder Follow directions given at office 238 g 0   • vitamin D (ERGOCALCIFEROL) 1.25 MG (54359 UT) capsule capsule Take 50,000 Units by mouth Every 7 (Seven) Days.         No current facility-administered medications on file prior to visit.     Allergies   Allergen Reactions   • Hydroxyzine Hives   • Lamotrigine Rash     Past Surgical History:   Procedure Laterality Date   • COLONOSCOPY     • ENDOSCOPY N/A 6/14/2022    Procedure: ESOPHAGOGASTRODUODENOSCOPY WITH BIOPSY;  Surgeon: Johnna Howell MD;  Location: [facility] ENDOSCOPY;  Service: Gastroenterology;  Laterality: N/A;   • OTHER SURGICAL HISTORY  2001    Laparoscopic excision uterine surface endometriotic tissue x2   • WISDOM TOOTH EXTRACTION       Family History   Problem Relation Age of Onset   • Cancer Mother    • COPD Mother    • Heart attack Father    • Esophageal cancer Maternal Uncle    • Heart attack Other         Grandparent   • Arthritis Other         Grandparent   • Cancer Other         Grandparent   • Diabetes Other         Grandparent   • Migraines  "Other         Grandparent   • Osteoporosis Other    • Colon cancer Neg Hx      Social History     Socioeconomic History   • Marital status:    Tobacco Use   • Smoking status: Former Smoker   • Smokeless tobacco: Never Used   • Tobacco comment: Quit in 2013   Vaping Use   • Vaping Use: Never used   Substance and Sexual Activity   • Alcohol use: Yes     Alcohol/week: 1.0 standard drink     Types: 1 Glasses of wine per week     Comment: occas.   • Drug use: No   • Sexual activity: Defer       Physical Examination:  Vital Signs: /82   Ht 167.6 cm (66\")   Wt 119 kg (262 lb)   BMI 42.29 kg/m²     General Appearance: alert, appears stated age, and cooperative  Breasts: Not performed.  Abdomen: no masses, no hepatomegaly, no splenomegaly, soft non-tender, no guarding, and no rebound tenderness  Pelvic: Not performed.    Data Review:  The following data was reviewed by: Gail Jenkins MD on 07/27/2022:     Labs:    Imaging:  US Non-ob Transvaginal (07/15/2022 14:13)    Medical Records:  EXTERNAL MEDICAL RECORDS - SCAN - Med REcords, MATTHEW,7/15/2022 (07/15/2022)  Op Note by Camacho Marques MD (06/30/2016 00:00)      Assessment and Plan   1. Menorrhagia with irregular cycle  The patient was informed that menstrual flow outside of normal volume, duration, regularity, or frequency is considered abnormal uterine bleeding.  The patient was informed that the normal duration of menstrual flow is usually 5 days and the normal cycle typically lasts between 21-35 days.  The patient was informed that heavy menstrual bleeding has been defined as blood loss greater than 80 mL and given that this is hard to quantify excessive blood loss is based on the patient's perception. The patient was informed that AUB most frequently occurs in women aged 19-39 years as a result of pregnancy, structural lesions such as polyps or fibroids, anovulatory cycles, use of hormonal contraception, and endometrial hyperplasia.  She was " counseled that endometrial cancer is less common but may occur.  It is recommended that she have a pregnancy test, CBC, and TSH.  Her pap smear needs to be up to date.  She needs screening for Chlamydia if she feels she is at high risk.  It is also recommended that she have a transvaginal ultrasound for further evaluation.  If anatomic abnormalities are noted then then surgery may be indicated. An endometrial ablation may also be an option to control bleeding in women who have completed childbearing.  The various options were discussed regarding medical management of her bleeding to include the use of nonsteroidal antiinflammatory drugs, progestins, combination oral contraceptives, a levonorgestrel intrauterine device, or tranexamic acid. The patient is informed if there is no improvement in her symptoms with medical management then she will need additional evaluation to include additional laboratory assessment and endometrial sampling.  Will obtain labs today as noted.  Patient is designed to obtain a copy of her previous medical records.  Patient will follow-up with transvaginal ultrasound following her menstrual cycle as discussed.  Plan pending results.  - CBC & Differential  - Estradiol  - Follicle Stimulating Hormone  - Testosterone, Free, Total  - TSH  - T4, Free  - T3, Free  - Antimullerian Hormone (AMH); Future  - US Non-ob Transvaginal; Future    2. Dysmenorrhea  Emily Tracey was counseled regarding the various etiologies for dysmenorrhea.  The patient was informed that primary dysmenorrhea is painful menstruation in the absence of pathology.  The various options for dysmenorrhea were discussed to include nonsteroidal antiinflammatory drugs, hormonal suppression, or both.  The patient was informed secondary dysmenorrhea is a result of pelvic pathology and is more common in patients with severe dysmenorrhea at menarche or progressively worsening dysmenorrhea, abnormal uterine bleeding, mid-cycle or  acyclic pain, infertility, family history of endometriosis, dyspareunia, or lack of response to empiric therapy.  Evaluation for secondary causes includes pelvic ultrasonography and possible laparoscopy.  The various treatment options for secondary dysmenorrhea depends upon the etiology as discussed.  - US Non-ob Transvaginal; Future    3. Menopausal symptoms  Will obtain labs today as noted.  Plan pending results.  - Estradiol  - Follicle Stimulating Hormone  - Testosterone, Free, Total  - Antimullerian Hormone (AMH); Future    4. Dyspareunia in female  Patient with dyspareunia as noted.  Patient did have a left ovarian cyst on her recent ultrasound.  Instructions and precautions are given.  Patient is to follow-up as discussed.  - US Non-ob Transvaginal; Future    5. Pelvic pain  Patient with pelvic pain as noted.  Patient did have recent transvaginal ultrasound.  Will obtain labs today as discussed.  Patient is also to sign to obtain her medical records.  Patient will follow up with repeat imaging.  - US Non-ob Transvaginal; Future    6. Personal history of endometriosis  Patient with known history of endometriosis.  Patient will follow-up with repeat imaging.  Patient's operative report has been reviewed as well.  - US Non-ob Transvaginal; Future    7. Left ovarian cyst  Patient with left ovarian cyst.  I discussed with the patient her ultrasound findings.  Instructions and precautions are given.  Patient will follow-up with repeat imaging as discussed.  - US Non-ob Transvaginal; Future    Follow Up/Instructions:  Follow up as noted.  Patient was given instructions and counseling regarding her condition or for health maintenance advice. Please see specific information pulled into the AVS if appropriate.     Note: Speech recognition transcription software may have been used to dictate portions of this document.  An attempt at proofreading has been made though minor errors in transcription may still be  present.    This note was electronically signed.  Gail Jenkins M.D.

## 2022-07-30 LAB
BASOPHILS # BLD AUTO: 0.03 10*3/MM3 (ref 0–0.2)
BASOPHILS NFR BLD AUTO: 0.5 % (ref 0–1.5)
EOSINOPHIL # BLD AUTO: 0.27 10*3/MM3 (ref 0–0.4)
EOSINOPHIL NFR BLD AUTO: 4.6 % (ref 0.3–6.2)
ERYTHROCYTE [DISTWIDTH] IN BLOOD BY AUTOMATED COUNT: 12.2 % (ref 12.3–15.4)
ESTRADIOL SERPL-MCNC: 69.7 PG/ML
FSH SERPL-ACNC: 14.5 MIU/ML
HCT VFR BLD AUTO: 40.6 % (ref 34–46.6)
HGB BLD-MCNC: 13.4 G/DL (ref 12–15.9)
IMM GRANULOCYTES # BLD AUTO: 0.02 10*3/MM3 (ref 0–0.05)
IMM GRANULOCYTES NFR BLD AUTO: 0.3 % (ref 0–0.5)
LYMPHOCYTES # BLD AUTO: 1.92 10*3/MM3 (ref 0.7–3.1)
LYMPHOCYTES NFR BLD AUTO: 32.7 % (ref 19.6–45.3)
MCH RBC QN AUTO: 32.5 PG (ref 26.6–33)
MCHC RBC AUTO-ENTMCNC: 33 G/DL (ref 31.5–35.7)
MCV RBC AUTO: 98.5 FL (ref 79–97)
MONOCYTES # BLD AUTO: 0.32 10*3/MM3 (ref 0.1–0.9)
MONOCYTES NFR BLD AUTO: 5.5 % (ref 5–12)
NEUTROPHILS # BLD AUTO: 3.31 10*3/MM3 (ref 1.7–7)
NEUTROPHILS NFR BLD AUTO: 56.4 % (ref 42.7–76)
NRBC BLD AUTO-RTO: 0 /100 WBC (ref 0–0.2)
PLATELET # BLD AUTO: 248 10*3/MM3 (ref 140–450)
RBC # BLD AUTO: 4.12 10*6/MM3 (ref 3.77–5.28)
T3FREE SERPL-MCNC: 3.3 PG/ML (ref 2–4.4)
T4 FREE SERPL-MCNC: 1.26 NG/DL (ref 0.93–1.7)
TESTOST FREE SERPL-MCNC: 0.9 PG/ML (ref 0–4.2)
TESTOST SERPL-MCNC: 25 NG/DL (ref 8–60)
TSH SERPL DL<=0.005 MIU/L-ACNC: 1.18 UIU/ML (ref 0.27–4.2)
WBC # BLD AUTO: 5.87 10*3/MM3 (ref 3.4–10.8)

## 2022-08-01 ENCOUNTER — TELEPHONE (OUTPATIENT)
Dept: OBSTETRICS AND GYNECOLOGY | Facility: CLINIC | Age: 37
End: 2022-08-01

## 2022-08-01 NOTE — TELEPHONE ENCOUNTER
Patient informed of normal labs, patient advised that you were going to wait on labs to start her on medication, she wanted to see if you wanted her to go ahead and get it called in or wait until after ultrasound?

## 2022-08-02 RX ORDER — MEDROXYPROGESTERONE ACETATE 10 MG/1
10 TABLET ORAL DAILY
Qty: 30 TABLET | Refills: 3 | Status: SHIPPED | OUTPATIENT
Start: 2022-08-02 | End: 2022-10-20

## 2022-08-11 ENCOUNTER — OFFICE VISIT (OUTPATIENT)
Dept: GASTROENTEROLOGY | Facility: CLINIC | Age: 37
End: 2022-08-11

## 2022-08-11 VITALS
SYSTOLIC BLOOD PRESSURE: 139 MMHG | TEMPERATURE: 97.5 F | HEIGHT: 66 IN | DIASTOLIC BLOOD PRESSURE: 96 MMHG | HEART RATE: 73 BPM | WEIGHT: 272 LBS | BODY MASS INDEX: 43.71 KG/M2

## 2022-08-11 DIAGNOSIS — K44.9 HIATAL HERNIA: ICD-10-CM

## 2022-08-11 DIAGNOSIS — K21.9 GASTROESOPHAGEAL REFLUX DISEASE WITHOUT ESOPHAGITIS: Primary | ICD-10-CM

## 2022-08-11 DIAGNOSIS — R13.19 ESOPHAGEAL DYSPHAGIA: ICD-10-CM

## 2022-08-11 DIAGNOSIS — K22.70 BARRETT'S ESOPHAGUS WITHOUT DYSPLASIA: ICD-10-CM

## 2022-08-11 PROCEDURE — 99214 OFFICE O/P EST MOD 30 MIN: CPT | Performed by: PHYSICIAN ASSISTANT

## 2022-08-11 RX ORDER — CYCLOBENZAPRINE HCL 5 MG
5 TABLET ORAL DAILY PRN
COMMUNITY

## 2022-08-11 RX ORDER — IPRATROPIUM BROMIDE AND ALBUTEROL SULFATE 2.5; .5 MG/3ML; MG/3ML
SOLUTION RESPIRATORY (INHALATION)
COMMUNITY
Start: 2022-06-08

## 2022-08-11 RX ORDER — FAMOTIDINE 40 MG/1
40 TABLET, FILM COATED ORAL DAILY PRN
Qty: 60 TABLET | Refills: 5 | Status: SHIPPED | OUTPATIENT
Start: 2022-08-11 | End: 2022-10-20

## 2022-08-11 RX ORDER — FLUTICASONE PROPIONATE 50 MCG
SPRAY, SUSPENSION (ML) NASAL
COMMUNITY
Start: 2022-05-31 | End: 2022-10-20

## 2022-08-11 RX ORDER — PANTOPRAZOLE SODIUM 40 MG/1
40 TABLET, DELAYED RELEASE ORAL DAILY
Qty: 30 TABLET | Refills: 5 | Status: SHIPPED | OUTPATIENT
Start: 2022-08-11 | End: 2022-10-20 | Stop reason: SDUPTHER

## 2022-08-11 NOTE — PROGRESS NOTES
"     Follow Up Note     Date: 2022   Patient Name: Emily Tracey  MRN: [id number]  : 1985     Primary Care Provider: Marly Britt APRN     Chief Complaint   Patient presents with   • Nausea   • Difficulty Swallowing   • Results     EGD     History of present illness:   2022  Emily Tracey is a 37 y.o. female who is here today for follow up regarding recent EGD, nausea and dysphagia.     She had EGD recently and would like to discuss those results. Currently, she is only taking famotidine PRN heartburn. She does not have reflux symptoms often, only about 1-2 times per week. Still with mild and intermittent dysphagia. She was given protonix after her procedure but has not started taking it yet.     Has colonoscopy scheduled for evaluation of other GI complaints, 2022.     Interval History:  2022  She feels that she could have Crohn's disease. Lost 100 lbs over the past 5 years. She has small \"ribbon like\" stools. Has mucous in stools. Nausea is present daily. Has intermittent vomiting. Has had black stools intermittently as well as bright red rectal bleeding at times. Has LUQ abdominal pain which is worse after eating. She was seen in the ED at [facility] due to passing large blood vaginal (thought was decidual cast). Pain in the LUQ radiates downward into the LLQ. She has noticed worse pain that around the time of her menstrual cycle, she will have diarrhea after a period of feeling very constipated. Her bowel habits have fluctuated in consistency for years, at least the past 6 years.      Has noticed an occasional sensation of dysphagia. Has sensation that there is a lump and points to the right side of her neck. Had thyroid ultrasound which was normal recently. Has heartburn 3-4 times per week. She takes famotidine 20 mg 3 times weekly PRN heartburn. Previously took zantac for years.      No prior history of liver disease. Had labs recently by PCP and vitamin B12 was low. " Maternal uncle with esophageal cancer. There is no known family history of colon cancer or colon polyps. No family history of IBD.    Subjective     Past Medical History:   Diagnosis Date   • Abdominal pain    • Anxiety    • Asthma    • Backache    • Blood in stool    • Colon polyp    • COVID-19 vaccine series completed     with booster   • Depression    • Diverticulitis of colon    • Edema    • Endometriosis    • GERD (gastroesophageal reflux disease)    • Kidney infection    • Low back pain    • LUQ pain    • Migraine headache    • Ovarian cyst    • Pain and swelling of right lower leg    • Panic attacks    • Sacroiliac joint pain    • Suspicious nevus    • Tattoo    • Urgency of urination    • Vaginitis    • Wears glasses      Past Surgical History:   Procedure Laterality Date   • COLONOSCOPY     • ENDOSCOPY N/A 2022    Procedure: ESOPHAGOGASTRODUODENOSCOPY WITH BIOPSY;  Surgeon: Johnna Howell MD;  Location: [facility] ENDOSCOPY;  Service: Gastroenterology;  Laterality: N/A;   • OTHER SURGICAL HISTORY      Laparoscopic excision uterine surface endometriotic tissue x2   • WISDOM TOOTH EXTRACTION       Family History   Problem Relation Age of Onset   • Cancer Mother    • COPD Mother    • Heart attack Father    • Esophageal cancer Maternal Uncle    • Heart attack Other         Grandparent   • Arthritis Other         Grandparent   • Cancer Other         Grandparent   • Diabetes Other         Grandparent   • Migraines Other         Grandparent   • Osteoporosis Other    • Colon cancer Neg Hx      Social History     Socioeconomic History   • Marital status:    Tobacco Use   • Smoking status: Former Smoker     Quit date:      Years since quittin.6   • Smokeless tobacco: Never Used   Vaping Use   • Vaping Use: Never used   Substance and Sexual Activity   • Alcohol use: Yes     Alcohol/week: 1.0 standard drink     Types: 1 Glasses of wine per week     Comment: occas.   • Drug use: No   •  Sexual activity: Defer     Current Outpatient Medications:   •  albuterol (PROVENTIL HFA;VENTOLIN HFA) 108 (90 BASE) MCG/ACT inhaler, 2 puffs 4 (four) times a day as needed., Disp: , Rfl:   •  cyanocobalamin 1000 MCG/ML injection, Inject 1,000 mcg into the appropriate muscle as directed by prescriber 1 (One) Time Per Week. Every other week, Disp: , Rfl:   •  fluticasone (FLONASE) 50 MCG/ACT nasal spray, USE 1 SPRAY BY NASAL ROUTE DAILY, Disp: , Rfl:   •  fluticasone-salmeterol (ADVAIR) 500-50 MCG/DOSE DISKUS, 1 puff 2 (two) times a day., Disp: , Rfl:   •  ondansetron ODT (ZOFRAN-ODT) 4 MG disintegrating tablet, Place 1 tablet on the tongue Every 8 (Eight) Hours As Needed for Nausea or Vomiting., Disp: 42 tablet, Rfl: 3  •  Famotidine (PEPCID PO), Take  by mouth., Disp: , Rfl:   •  ipratropium-albuterol (DUO-NEB) 0.5-2.5 mg/3 ml nebulizer, INHALE ONE VIAL (3ML) VIA NEBULIZER FOUR TIMES DAILY, Disp: , Rfl:   •  medroxyPROGESTERone (Provera) 10 MG tablet, Take 1 tablet by mouth Daily., Disp: 30 tablet, Rfl: 3  •  polyethylene glycol (MIRALAX) 17 GM/SCOOP powder, Follow directions given at office, Disp: 238 g, Rfl: 0  •  vitamin D (ERGOCALCIFEROL) 1.25 MG (20989 UT) capsule capsule, Take 50,000 Units by mouth Every 7 (Seven) Days.  , Disp: , Rfl:     Allergies   Allergen Reactions   • Hydroxyzine Hives   • Lamotrigine Rash     The following portions of the patient's history were reviewed and updated as appropriate: allergies, current medications, past family history, past medical history, past social history, past surgical history and problem list.    Objective     Physical Exam  Constitutional:       General: She is not in acute distress.     Appearance: Normal appearance. She is well-developed. She is obese. She is not diaphoretic.   HENT:      Head: Normocephalic and atraumatic.      Right Ear: External ear normal.      Left Ear: External ear normal.      Nose: Nose normal.      Mouth/Throat:      Comments: Wearing a  "mask  Eyes:      General: No scleral icterus.        Right eye: No discharge.         Left eye: No discharge.      Conjunctiva/sclera: Conjunctivae normal.   Neck:      Trachea: No tracheal deviation.   Pulmonary:      Effort: Pulmonary effort is normal. No respiratory distress.   Musculoskeletal:         General: Normal range of motion.      Cervical back: Normal range of motion.   Skin:     Coloration: Skin is not pale.      Findings: No erythema or rash.   Neurological:      Mental Status: She is alert and oriented to person, place, and time.      Coordination: Coordination normal.   Psychiatric:         Mood and Affect: Mood normal.         Behavior: Behavior normal.         Thought Content: Thought content normal.         Judgment: Judgment normal.       Vitals:    08/11/22 1015   BP: 139/96   Pulse: 73   Temp: 97.5 °F (36.4 °C)   TempSrc: Infrared   Weight: 123 kg (272 lb)   Height: 167.6 cm (66\")       Results Review:   I reviewed the patient's new clinical results.    Office Visit on 07/27/2022   Component Date Value Ref Range Status   • WBC 07/27/2022 5.87  3.40 - 10.80 10*3/mm3 Final   • RBC 07/27/2022 4.12  3.77 - 5.28 10*6/mm3 Final   • Hemoglobin 07/27/2022 13.4  12.0 - 15.9 g/dL Final   • Hematocrit 07/27/2022 40.6  34.0 - 46.6 % Final   • MCV 07/27/2022 98.5 (A) 79.0 - 97.0 fL Final   • MCH 07/27/2022 32.5  26.6 - 33.0 pg Final   • MCHC 07/27/2022 33.0  31.5 - 35.7 g/dL Final   • RDW 07/27/2022 12.2 (A) 12.3 - 15.4 % Final   • Platelets 07/27/2022 248  140 - 450 10*3/mm3 Final   • Neutrophil Rel % 07/27/2022 56.4  42.7 - 76.0 % Final   • Lymphocyte Rel % 07/27/2022 32.7  19.6 - 45.3 % Final   • Monocyte Rel % 07/27/2022 5.5  5.0 - 12.0 % Final   • Eosinophil Rel % 07/27/2022 4.6  0.3 - 6.2 % Final   • Basophil Rel % 07/27/2022 0.5  0.0 - 1.5 % Final   • Neutrophils Absolute 07/27/2022 3.31  1.70 - 7.00 10*3/mm3 Final   • Lymphocytes Absolute 07/27/2022 1.92  0.70 - 3.10 10*3/mm3 Final   • Monocytes " Absolute 07/27/2022 0.32  0.10 - 0.90 10*3/mm3 Final   • Eosinophils Absolute 07/27/2022 0.27  0.00 - 0.40 10*3/mm3 Final   • Basophils Absolute 07/27/2022 0.03  0.00 - 0.20 10*3/mm3 Final   • Immature Granulocyte Rel % 07/27/2022 0.3  0.0 - 0.5 % Final   • Immature Grans Absolute 07/27/2022 0.02  0.00 - 0.05 10*3/mm3 Final   • nRBC 07/27/2022 0.0  0.0 - 0.2 /100 WBC Final   • Estradiol 07/27/2022 69.7  pg/mL Final      US Thyroid  Result Date: 5/18/2022  Unremarkable thyroid evaluation      EGD was completed by Dr. Howell on 6/14/2022:  - The oropharynx was normal.  - The Z-line was irregular and was found 39 cm from the incisors.  - A 2 cm hiatal hernia was present.  - The esophagus and gastroesophageal junction were examined with white light and narrow band imaging (NBI). There were esophageal mucosal changes suspicious for long-segment Chatman's esophagus, classified as Chatman's stage C3-M4 per [place] criteria. These changes involved the mucosa extending to the Z-line. Diffuse salmon-colored mucosa was present from 35 to 39 cm and no visible abnormalities were present. The maximum longitudinal extent of these esophageal mucosal changes was 4 cm in length. Mucosa was biopsied with a cold forceps for histology randomly at intervals of 2 cm in the lower third of the esophagus. One specimen bottle was sent to pathology.  - Patchy mildly erythematous mucosa without bleeding was found on the posterior wall of the stomach and in the gastric antrum. Biopsies were taken with a cold forceps for Helicobacter pylori testing.  - The duodenal bulb, first portion of the duodenum, second portion of the duodenum and third portion of the duodenum were normal. Biopsies for histology were taken with a cold forceps for evaluation of celiac disease.  - No endoscopic abnormality was evident in the esophagus to explain the patient's complaint of dysphagia. Biopsies were obtained from the proximal and distal esophagus with cold  forceps for histology for eosinophilic esophagitis.  Pathology DIAGNOSIS:   A.   DUODENUM, BIOPSY:   Small bowel mucosa with no significant pathologic abnormality   B.   ANTRUM AND BODY, BIOPSY:   Antral type gastric mucosa with reactive gastropathy   Body type gastric mucosa with no significant pathologic abnormality   No Helicobacter pylori like organisms identified on H&E stained slide   No intestinal metaplasia or dysplasia identified   C.   ESOPHAGUS, BIOPSY, LOWER:   Squamocolumnar junctional mucosa with intestinal metaplasia; findings compatible with Chatman's esophagus   No evidence of dysplasia identified   D.   ESOPHAGUS, BIOPSY, DISTAL, MID AND PROXIMAL:   Squamous mucosa with mild reactive/reflux related changes   No increased intraepithelial eosinophils, intestinal metaplasia or   dysplasia identified    Assessment / Plan      1. Gastroesophageal reflux disease without esophagitis  2. Chatman's esophagus without dysplasia  3. Esophageal dysphagia  4. Hiatal hernia  Still having occasional dysphagia. Has sensation that there is a lump and points to the right side of her neck. Had thyroid ultrasound which was normal recently. Also reports sensation that food is getting stuck and points to lower mid chest area. Has heartburn most recently only about 1-2 times per week. She is currently only taking famotidine 20 mg PRN heartburn. Previously took zantac for years. Nausea is present daily. Has intermittent vomiting.    EGD 6/2022 showed long segment (4 cm in length) Chatman's esophagus, 2 cm hiatal hernia, patchy mild erythema in the stomach, normal duodenum, no endoscopic esophageal abnormality to explain patient's dysphagia.       Acid reflux measures  Start Protonix 40 mg once daily (long term due to Chatman's)  Continue famotidine PRN heartburn  Information on Chatman's esophagus given  Nausea likely related to IBS, can continue Zofran PRN for now  Low fiber, small meals  Avoid NSAIDs  Dietary changes and  "lifestyle changes including losing weight  Repeat EGD 2 years, due 6/2024    - pantoprazole (PROTONIX) 40 MG EC tablet; Take 1 tablet by mouth Daily.  Dispense: 30 tablet; Refill: 5  - famotidine (Pepcid) 40 MG tablet; Take 1 tablet by mouth Daily As Needed for Heartburn (can take up to 2 times daily PRN).  Dispense: 60 tablet; Refill: 5                Prior history:  LUQ abdominal pain  Change in bowel habits  Rectal bleeding  Weight loss, abnormal  LUQ abdominal pain has been present and worsening for the past several years. Pain becomes worse after eating, does not seem to matter the types of foods. Pain in the LUQ radiates downward into the LLQ. Has worse pain that around the time of her menstrual cycle, she will have diarrhea after a period of feeling very constipated. Her bowel habits have fluctuated in consistency for years, at least the past 6 years but prior to that were occurring daily. She has small \"ribbon like\" stools, mucous in stools and intermittent bright red rectal bleeding.      Previous EGD 2015 showed food residue in the stomach and findings concerning for Chatman's (path negative). Colonoscopy 2015 showed focal areas of erythema in the terminal ileum (path normal), normal random colon biopsies and proctitis (path described ischemia or NSAID effect).      Lost 100 lbs over the past 5 years. Had labs recently by PCP and vitamin B12 was low. No recent abdominal imaging.      Suspect underlying IBS as etiology of her complaints but also has known history of gastroparesis and some previous abnormal endoscopic findings. IBD needs to be ruled out.      Colonoscopy pending  Zofran PRN nausea  CTAP will be considered if EGD/colonoscopy normal to evaluate unintentional weight loss    Family history of esophageal cancer  Maternal uncle with esophageal cancer.       Follow Up:   Return for follow up after procedure to discuss results. Has colonoscopy planned next week.       Darcie Cabrales, " LOREN  [facility] [place]  8/11/2022  11:25 EDT    Dictated Utilizing Dragon Dictation: Part of this note may be an electronic transcription/translation of spoken language to printed text using the Dragon Dictation System.

## 2022-08-11 NOTE — PATIENT INSTRUCTIONS
Elevate head of bed  Do not eat 3 hours before bed  Do not overeat  Avoid caffeine  Avoid nicotine  Continue protonix daily  Repeat EGD in 2 years

## 2022-08-16 ENCOUNTER — ANESTHESIA EVENT (OUTPATIENT)
Dept: GASTROENTEROLOGY | Facility: HOSPITAL | Age: 37
End: 2022-08-16

## 2022-08-16 ENCOUNTER — HOSPITAL ENCOUNTER (OUTPATIENT)
Facility: HOSPITAL | Age: 37
Setting detail: HOSPITAL OUTPATIENT SURGERY
Discharge: HOME | End: 2022-08-16
Attending: INTERNAL MEDICINE | Admitting: INTERNAL MEDICINE

## 2022-08-16 ENCOUNTER — ANESTHESIA (OUTPATIENT)
Dept: GASTROENTEROLOGY | Facility: HOSPITAL | Age: 37
End: 2022-08-16

## 2022-08-16 VITALS
HEIGHT: 67 IN | SYSTOLIC BLOOD PRESSURE: 125 MMHG | BODY MASS INDEX: 42.69 KG/M2 | TEMPERATURE: 97.2 F | WEIGHT: 272 LBS | OXYGEN SATURATION: 97 % | DIASTOLIC BLOOD PRESSURE: 81 MMHG | RESPIRATION RATE: 18 BRPM | HEART RATE: 67 BPM

## 2022-08-16 DIAGNOSIS — R19.4 CHANGE IN BOWEL HABITS: ICD-10-CM

## 2022-08-16 DIAGNOSIS — R10.12 LUQ ABDOMINAL PAIN: ICD-10-CM

## 2022-08-16 PROCEDURE — 25010000002 PROPOFOL 200 MG/20ML EMULSION: Performed by: NURSE ANESTHETIST, CERTIFIED REGISTERED

## 2022-08-16 PROCEDURE — 45385 COLONOSCOPY W/LESION REMOVAL: CPT | Performed by: INTERNAL MEDICINE

## 2022-08-16 PROCEDURE — 82962 GLUCOSE BLOOD TEST: CPT

## 2022-08-16 PROCEDURE — 81025 URINE PREGNANCY TEST: CPT | Performed by: INTERNAL MEDICINE

## 2022-08-16 PROCEDURE — 45380 COLONOSCOPY AND BIOPSY: CPT | Performed by: INTERNAL MEDICINE

## 2022-08-16 RX ORDER — PROPOFOL 10 MG/ML
INJECTION, EMULSION INTRAVENOUS AS NEEDED
Status: DISCONTINUED | OUTPATIENT
Start: 2022-08-16 | End: 2022-08-16 | Stop reason: SURG

## 2022-08-16 RX ORDER — SODIUM CHLORIDE 9 MG/ML
30 INJECTION, SOLUTION INTRAVENOUS CONTINUOUS PRN
Status: DISCONTINUED | OUTPATIENT
Start: 2022-08-16 | End: 2022-08-16 | Stop reason: HOSPADM

## 2022-08-16 RX ORDER — LIDOCAINE HYDROCHLORIDE 20 MG/ML
INJECTION, SOLUTION INTRAVENOUS AS NEEDED
Status: DISCONTINUED | OUTPATIENT
Start: 2022-08-16 | End: 2022-08-16 | Stop reason: SURG

## 2022-08-16 RX ADMIN — PROPOFOL 100 MG: 10 INJECTION, EMULSION INTRAVENOUS at 11:29

## 2022-08-16 RX ADMIN — PROPOFOL 50 MG: 10 INJECTION, EMULSION INTRAVENOUS at 11:35

## 2022-08-16 RX ADMIN — SODIUM CHLORIDE 30 ML/HR: 9 INJECTION, SOLUTION INTRAVENOUS at 09:46

## 2022-08-16 RX ADMIN — PROPOFOL 50 MG: 10 INJECTION, EMULSION INTRAVENOUS at 11:40

## 2022-08-16 RX ADMIN — PROPOFOL 100 MG: 10 INJECTION, EMULSION INTRAVENOUS at 11:32

## 2022-08-16 RX ADMIN — LIDOCAINE HYDROCHLORIDE 40 MG: 20 INJECTION, SOLUTION INTRAVENOUS at 11:26

## 2022-08-16 RX ADMIN — PROPOFOL 50 MG: 10 INJECTION, EMULSION INTRAVENOUS at 11:38

## 2022-08-16 RX ADMIN — PROPOFOL 100 MG: 10 INJECTION, EMULSION INTRAVENOUS at 11:26

## 2022-08-16 NOTE — ANESTHESIA POSTPROCEDURE EVALUATION
Patient: Emily Tracey    Procedure Summary     Date: 08/16/22 Room / Location: [facility] ENDOSCOPY 2 / [facility] ENDOSCOPY    Anesthesia Start: 1121 Anesthesia Stop:     Procedure: COLONOSCOPY CPT CODE: 32481 (N/A Anus) Diagnosis:       LUQ abdominal pain      Change in bowel habits      (LUQ abdominal pain [R10.12])      (Change in bowel habits [R19.4])    Surgeons: Johnna Howell MD Provider: Davie Fulton CRNA    Anesthesia Type: MAC ASA Status: 3          Anesthesia Type: MAC    Vitals  HR 72  Sat 98  Resp 17  /61  Temp 98        Post Anesthesia Care and Evaluation    Patient location during evaluation: bedside  Patient participation: complete - patient participated  Level of consciousness: awake and alert and sleepy but conscious  Pain score: 0  Pain management: adequate    Airway patency: patent  Anesthetic complications: No anesthetic complications  PONV Status: none  Cardiovascular status: acceptable  Respiratory status: acceptable and nasal cannula  Hydration status: acceptable

## 2022-08-16 NOTE — ANESTHESIA PREPROCEDURE EVALUATION
Anesthesia Evaluation     Patient summary reviewed and Nursing notes reviewed   no history of anesthetic complications:  NPO Solid Status: > 8 hours  NPO Liquid Status: > 8 hours           Airway   Mallampati: I  TM distance: >3 FB  Neck ROM: full  Possible difficult intubation  Dental - normal exam     Pulmonary - normal exam   (+) asthma,  Cardiovascular - negative cardio ROS and normal exam        Neuro/Psych  (+) headaches, numbness, psychiatric history,    GI/Hepatic/Renal/Endo    (+) obesity, morbid obesity, GERD,  renal disease,     Musculoskeletal     Abdominal   (+) obese,    Substance History - negative use     OB/GYN negative ob/gyn ROS   (-)  Pregnant        Other   arthritis,                        Anesthesia Plan    ASA 3     MAC     (Risks and benefits discussed including risk of aspiration, recall and dental damage. All patient questions answered.    Will continue with plan of care.)  intravenous induction     Anesthetic plan, risks, benefits, and alternatives have been provided, discussed and informed consent has been obtained with: patient.  Pre-procedure education provided  Plan discussed with CRNA.        CODE STATUS:

## 2022-08-16 NOTE — H&P
[facility]  HISTORY AND PHYSICAL    Patient Name: Emily Tracey  : 1985  MRN: [id number]    Chief Complaint:   For colonoscopy    History Of Presenting Illness:    Change in bowel habit  Rectal bleeding  Lower abdominal pain  Wt loss    Past Medical History:   Diagnosis Date   • Abdominal pain    • Anxiety    • Asthma    • Backache    • Blood in stool    • Colon polyp    • COVID-19 vaccine series completed     with booster   • Depression    • Diverticulitis of colon    • Edema    • Endometriosis    • GERD (gastroesophageal reflux disease)    • Kidney infection    • Low back pain    • LUQ pain    • Migraine headache    • Ovarian cyst    • Pain and swelling of right lower leg    • Panic attacks    • Sacroiliac joint pain    • Suspicious nevus    • Tattoo    • Urgency of urination    • Vaginitis    • Wears glasses        Past Surgical History:   Procedure Laterality Date   • COLONOSCOPY     • ENDOSCOPY N/A 2022    Procedure: ESOPHAGOGASTRODUODENOSCOPY WITH BIOPSY;  Surgeon: Johnna Howell MD;  Location: [facility] ENDOSCOPY;  Service: Gastroenterology;  Laterality: N/A;   • OTHER SURGICAL HISTORY      Laparoscopic excision uterine surface endometriotic tissue x2   • WISDOM TOOTH EXTRACTION         Social History     Socioeconomic History   • Marital status:    Tobacco Use   • Smoking status: Former Smoker     Quit date:      Years since quittin.6   • Smokeless tobacco: Never Used   Vaping Use   • Vaping Use: Never used   Substance and Sexual Activity   • Alcohol use: Yes     Alcohol/week: 1.0 standard drink     Types: 1 Glasses of wine per week     Comment: occas.   • Drug use: No   • Sexual activity: Defer       Family History   Problem Relation Age of Onset   • Cancer Mother    • COPD Mother    • Heart attack Father    • Esophageal cancer Maternal Uncle    • Heart attack Other         Grandparent   • Arthritis Other         Grandparent   • Cancer Other          Grandparent   • Diabetes Other         Grandparent   • Migraines Other         Grandparent   • Osteoporosis Other    • Colon cancer Neg Hx        Prior to Admission Medications:  Medications Prior to Admission   Medication Sig Dispense Refill Last Dose   • albuterol (PROVENTIL HFA;VENTOLIN HFA) 108 (90 BASE) MCG/ACT inhaler 2 puffs 4 (four) times a day as needed.   8/15/2022 at Unknown time   • cyanocobalamin 1000 MCG/ML injection Inject 1,000 mcg into the appropriate muscle as directed by prescriber 1 (One) Time Per Week. Every other week   Past Week at Unknown time   • cyclobenzaprine (FLEXERIL) 5 MG tablet Take 5 mg by mouth Daily As Needed.   Past Week at Unknown time   • famotidine (Pepcid) 40 MG tablet Take 1 tablet by mouth Daily As Needed for Heartburn (can take up to 2 times daily PRN). 60 tablet 5 8/15/2022 at Unknown time   • fluticasone (FLONASE) 50 MCG/ACT nasal spray USE 1 SPRAY BY NASAL ROUTE DAILY   Past Week at Unknown time   • fluticasone-salmeterol (ADVAIR) 500-50 MCG/DOSE DISKUS 1 puff 2 (two) times a day.   8/15/2022 at Unknown time   • ipratropium-albuterol (DUO-NEB) 0.5-2.5 mg/3 ml nebulizer INHALE ONE VIAL (3ML) VIA NEBULIZER FOUR TIMES DAILY   8/15/2022 at Unknown time   • medroxyPROGESTERone (Provera) 10 MG tablet Take 1 tablet by mouth Daily. 30 tablet 3 8/15/2022 at Unknown time   • ondansetron ODT (ZOFRAN-ODT) 4 MG disintegrating tablet Place 1 tablet on the tongue Every 8 (Eight) Hours As Needed for Nausea or Vomiting. 42 tablet 3 Past Month at Unknown time   • pantoprazole (PROTONIX) 40 MG EC tablet Take 1 tablet by mouth Daily. 30 tablet 5 8/15/2022 at Unknown time   • polyethylene glycol (MIRALAX) 17 GM/SCOOP powder Follow directions given at office 238 g 0 8/15/2022 at 1900       Allergies:  Allergies   Allergen Reactions   • Hydroxyzine Hives   • Lamotrigine Rash        Vitals: Temp:  [97.5 °F (36.4 °C)] 97.5 °F (36.4 °C)  Heart Rate:  [66] 66  Resp:  [18] 18  BP: (122)/(80)  122/80    Review Of Systems:  Constitutional:  Negative for chills, fever, and unexpected weight change.  Respiratory:  Negative for cough, chest tightness, shortness of breath, and wheezing.  Cardiovascular:  Negative for chest pain, palpitations, and leg swelling.  Gastrointestinal:  Negative for abdominal distention, abdominal pain, Nausea, vomiting.  Neurological:  Negative for Weakness, numbness, and headaches.     Physical Exam:    General Appearance:  Alert, cooperative, in no acute distress.   Lungs:   Clear to auscultation, respirations regular, even and                 unlabored.   Heart:  Regular rhythm and normal rate.   Abdomen:   Normal bowel sounds, no masses, no organomegaly. Soft, non-tender, non-distended   Neurologic: Alert and oriented x 3. Moves all four limbs equally       Plan: COLONOSCOPY CPT CODE: 99916 (N/A)     Johnna Howell MD  8/16/2022

## 2022-08-16 NOTE — DISCHARGE INSTRUCTIONS
- Discharge patient to home (ambulatory).   - High fiber diet.   - Continue present medications.   - Await pathology results.   - Anusol cream prn for hemorrhoidal flare  - Repeat colonoscopy in 5-7 years for surveillance.   - Return to GI office in 8 weeks.    No pushing, pulling, tugging,  heavy lifting, or strenuous activity.  No major decision making, driving, or drinking alcoholic beverages for 24 hours. ( due to the medications you have  received)  Always use good hand hygiene/washing techniques.  NO driving while taking pain medications.    Rest today    To assist you in voiding:  Drink plenty of fluids  Listen to running water while attempting to void.    If you are unable to urinate and you have an uncomfortable urge to void or it has been   6 hours since you were discharged, return to the Emergency Room

## 2022-08-17 LAB — REF LAB TEST METHOD: NORMAL

## 2022-08-19 LAB — GLUCOSE BLDC GLUCOMTR-MCNC: 93 MG/DL (ref 70–130)

## 2022-08-25 ENCOUNTER — OFFICE VISIT (OUTPATIENT)
Dept: OBSTETRICS AND GYNECOLOGY | Facility: CLINIC | Age: 37
End: 2022-08-25

## 2022-08-25 VITALS
BODY MASS INDEX: 41.59 KG/M2 | WEIGHT: 265 LBS | DIASTOLIC BLOOD PRESSURE: 68 MMHG | HEIGHT: 67 IN | SYSTOLIC BLOOD PRESSURE: 126 MMHG

## 2022-08-25 DIAGNOSIS — N94.6 DYSMENORRHEA: ICD-10-CM

## 2022-08-25 DIAGNOSIS — N92.1 MENORRHAGIA WITH IRREGULAR CYCLE: Primary | ICD-10-CM

## 2022-08-25 DIAGNOSIS — Z87.42 PERSONAL HISTORY OF ENDOMETRIOSIS: ICD-10-CM

## 2022-08-25 DIAGNOSIS — R10.2 PELVIC PAIN: ICD-10-CM

## 2022-08-25 DIAGNOSIS — N83.201 RIGHT OVARIAN CYST: ICD-10-CM

## 2022-08-25 DIAGNOSIS — N94.10 DYSPAREUNIA IN FEMALE: ICD-10-CM

## 2022-08-25 DIAGNOSIS — N95.1 MENOPAUSAL SYMPTOMS: ICD-10-CM

## 2022-08-25 PROCEDURE — 99214 OFFICE O/P EST MOD 30 MIN: CPT | Performed by: OBSTETRICS & GYNECOLOGY

## 2022-08-25 NOTE — PROGRESS NOTES
Chief Complaint  Follow-up (Transvaginal ultrasound- Menorrhagia, ovarian cyst. )     History of Present Illness:  Patient is 37 y.o. No obstetric history on file. who presents to [facility] here for follow-up evaluation of her menorrhagia as well as left ovarian cyst.  Patient reports improvement in the pelvic pain.  Patient did have her last menstrual cycle starting on August 19.  This lasted for several days.  Patient did have labs last visit as noted.  Patient is inquiring regarding future fertility.  She continues to have menopausal symptoms.  Patient continues to have dysmenorrhea and menorrhagia with irregular cycles.  Continues to have dysmenorrhea.  Patient has known history of endometriosis.  Patient is also here for follow-up of left ovarian cyst.    History  Past Medical History:   Diagnosis Date   • Abdominal pain    • Anxiety    • Asthma    • Backache    • Blood in stool    • Colon polyp    • COVID-19 vaccine series completed     with booster   • Depression 2001   • Diverticulitis of colon    • Edema    • Endometriosis    • GERD (gastroesophageal reflux disease)    • Kidney infection    • Low back pain    • LUQ pain    • Migraine headache    • Ovarian cyst    • Pain and swelling of right lower leg    • Panic attacks    • Sacroiliac joint pain    • Suspicious nevus    • Tattoo    • Urgency of urination    • Vaginitis    • Wears glasses      Current Outpatient Medications on File Prior to Visit   Medication Sig Dispense Refill   • albuterol (PROVENTIL HFA;VENTOLIN HFA) 108 (90 BASE) MCG/ACT inhaler 2 puffs 4 (four) times a day as needed.     • cyanocobalamin 1000 MCG/ML injection Inject 1,000 mcg into the appropriate muscle as directed by prescriber 1 (One) Time Per Week. Every other week     • cyclobenzaprine (FLEXERIL) 5 MG tablet Take 5 mg by mouth Daily As Needed.     • famotidine (Pepcid) 40 MG tablet Take 1 tablet by mouth Daily As Needed for Heartburn (can take up to 2  times daily PRN). 60 tablet 5   • fluticasone (FLONASE) 50 MCG/ACT nasal spray USE 1 SPRAY BY NASAL ROUTE DAILY     • fluticasone-salmeterol (ADVAIR) 500-50 MCG/DOSE DISKUS 1 puff 2 (two) times a day.     • ipratropium-albuterol (DUO-NEB) 0.5-2.5 mg/3 ml nebulizer INHALE ONE VIAL (3ML) VIA NEBULIZER FOUR TIMES DAILY     • medroxyPROGESTERone (Provera) 10 MG tablet Take 1 tablet by mouth Daily. 30 tablet 3   • ondansetron ODT (ZOFRAN-ODT) 4 MG disintegrating tablet Place 1 tablet on the tongue Every 8 (Eight) Hours As Needed for Nausea or Vomiting. 42 tablet 3   • pantoprazole (PROTONIX) 40 MG EC tablet Take 1 tablet by mouth Daily. 30 tablet 5   • polyethylene glycol (MIRALAX) 17 GM/SCOOP powder Follow directions given at office 238 g 0     No current facility-administered medications on file prior to visit.     Allergies   Allergen Reactions   • Hydroxyzine Hives   • Lamotrigine Rash     Past Surgical History:   Procedure Laterality Date   • COLONOSCOPY     • COLONOSCOPY N/A 8/16/2022    Procedure: COLONOSCOPY CPT CODE: 55457;  Surgeon: Johnna Howell MD;  Location: [facility];  Service: Gastroenterology;  Laterality: N/A;   • ENDOSCOPY N/A 6/14/2022    Procedure: ESOPHAGOGASTRODUODENOSCOPY WITH BIOPSY;  Surgeon: Johnna Howell MD;  Location: [facility] ENDOSCOPY;  Service: Gastroenterology;  Laterality: N/A;   • OTHER SURGICAL HISTORY  2001    Laparoscopic excision uterine surface endometriotic tissue x2   • WISDOM TOOTH EXTRACTION       Family History   Problem Relation Age of Onset   • Cancer Mother    • COPD Mother    • Heart attack Father    • Esophageal cancer Maternal Uncle    • Heart attack Other         Grandparent   • Arthritis Other         Grandparent   • Cancer Other         Grandparent   • Diabetes Other         Grandparent   • Migraines Other         Grandparent   • Osteoporosis Other    • Colon cancer Neg Hx      Social History     Socioeconomic History   • Marital status:   "  Tobacco Use   • Smoking status: Former Smoker     Quit date:      Years since quittin.6   • Smokeless tobacco: Never Used   Vaping Use   • Vaping Use: Never used   Substance and Sexual Activity   • Alcohol use: Yes     Alcohol/week: 1.0 standard drink     Types: 1 Glasses of wine per week     Comment: occas.   • Drug use: No   • Sexual activity: Defer       Physical Examination:  Vital Signs: /68   Ht 170.2 cm (67\")   Wt 120 kg (265 lb)   BMI 41.50 kg/m²     General Appearance: alert, appears stated age, and cooperative  Breasts: Not performed.  Abdomen: no masses, no hepatomegaly, no splenomegaly, soft non-tender, no guarding, and no rebound tenderness  Pelvic: Not performed.    Data Review:  The following data was reviewed by: Gail Jenkins MD on 2022:     Labs:  CBC & Differential (2022 15:18)  Estradiol (2022 15:18)  Follicle Stimulating Hormone (2022 15:18)  Testosterone, Free, Total (2022 15:18)  TSH (2022 15:18)  T4, Free (2022 15:18)  T3, Free (2022 15:18)    Imaging:  US Non-ob Transvaginal (2022 13:19)      Medical Records:  None    Assessment and Plan   1. Menorrhagia with irregular cycle  Transvaginal ultrasound is obtained today.  Patient has been informed regarding those findings.  I discussed with the patient the various options for management of her symptoms.  Patient had been given Provera as noted.  Transvaginal ultrasound is noted today.  Instructions and precautions have been given.    2. Dyspareunia in female  Patient with continued dysmenorrhea as noted.  Her previously seen left ovarian cyst is resolved.  Instructions and precautions have been given.    3. Pelvic pain  Patient with improvement in her pelvic pain as noted.  Transvaginal ultrasound was obtained.  Patient has been informed regarding those findings.    4. Dysmenorrhea  I have discussed with the patient the various options for management of her dysmenorrhea.  " Patient is to consider the options as discussed.    5. Right ovarian cyst  Patient with small right ovarian cyst.  She has been informed regarding the findings on ultrasound.  Instructions and precautions are given.  Patient may return in 6 to 8 weeks with repeat imaging if desired.    6. Menopausal symptoms  Patient with continued menopausal symptoms as noted.  Patient is desiring future fertility.  Patient is to return on day 21 or 22 of her cycle for serum progesterone level as well as AMH.  - Antimullerian Hormone (AMH); Future  - Progesterone; Future    7. Personal history of endometriosis  Patient with known history of endometriosis.  Transvaginal ultrasound was obtained today as noted.  She has been informed regarding those findings.    Follow Up/Instructions:  Follow up as noted.  Patient was given instructions and counseling regarding her condition or for health maintenance advice. Please see specific information pulled into the AVS if appropriate.     Note: Speech recognition transcription software may have been used to dictate portions of this document.  An attempt at proofreading has been made though minor errors in transcription may still be present.    This note was electronically signed.  Gail Jenkins M.D.

## 2022-09-09 ENCOUNTER — LAB (OUTPATIENT)
Dept: LAB | Facility: HOSPITAL | Age: 37
End: 2022-09-09

## 2022-09-09 DIAGNOSIS — N95.1 MENOPAUSAL SYMPTOMS: ICD-10-CM

## 2022-09-09 PROCEDURE — 82397 CHEMILUMINESCENT ASSAY: CPT

## 2022-09-09 PROCEDURE — 84144 ASSAY OF PROGESTERONE: CPT

## 2022-09-09 PROCEDURE — 36415 COLL VENOUS BLD VENIPUNCTURE: CPT

## 2022-09-10 LAB — PROGEST SERPL-MCNC: 7.34 NG/ML

## 2022-09-14 LAB — MIS SERPL-MCNC: 0.36 NG/ML

## 2022-10-18 ENCOUNTER — TELEPHONE (OUTPATIENT)
Dept: OBSTETRICS AND GYNECOLOGY | Facility: CLINIC | Age: 37
End: 2022-10-18

## 2022-10-18 NOTE — TELEPHONE ENCOUNTER
----- Message from Emily Tracey sent at 10/18/2022  2:56 PM EDT -----  Regarding: Updates Needed  Contact: [phone]  Hello, I am still waiting on a response from Dr. Jenkins about my last test.  She was out sick with her daughter when I originally inquired about this.  I tried to reply to the last message about it, but the conversation was too old, so I had to start a new one.  The questions were in regard to the blood test that indicated my reserves were low. I wanted to know what does that mean for myself going forward and does it indicate anything else, such as earlier menopause, etc?  If someone could please get back to me, I would really appreciate it.  Thanks.

## 2022-10-18 NOTE — TELEPHONE ENCOUNTER
----- Message from Emily Tracey sent at 10/18/2022  2:56 PM EDT -----  Regarding: Updates Needed  Contact: [phone]  Hello, I am still waiting on a response from Dr. Jenkins about my last test.  She was out sick with her daughter when I originally inquired about this.  I tried to reply to the last message about it, but the conversation was too old, so I had to start a new one.  The questions were in regard to the blood test that indicated my reserves were low. I wanted to know what does that mean for myself going forward and does it indicate anything else, such as earlier menopause, etc?  If someone could please get back to me, I would really appreciate it.  Thanks.

## 2022-10-20 ENCOUNTER — OFFICE VISIT (OUTPATIENT)
Dept: GASTROENTEROLOGY | Facility: CLINIC | Age: 37
End: 2022-10-20

## 2022-10-20 DIAGNOSIS — R63.4 WEIGHT LOSS, ABNORMAL: ICD-10-CM

## 2022-10-20 DIAGNOSIS — R10.12 LUQ ABDOMINAL PAIN: ICD-10-CM

## 2022-10-20 DIAGNOSIS — R13.19 ESOPHAGEAL DYSPHAGIA: ICD-10-CM

## 2022-10-20 DIAGNOSIS — K22.70 BARRETT'S ESOPHAGUS WITHOUT DYSPLASIA: ICD-10-CM

## 2022-10-20 DIAGNOSIS — K21.9 GASTROESOPHAGEAL REFLUX DISEASE WITHOUT ESOPHAGITIS: ICD-10-CM

## 2022-10-20 DIAGNOSIS — K58.0 IRRITABLE BOWEL SYNDROME WITH DIARRHEA: Primary | ICD-10-CM

## 2022-10-20 PROCEDURE — 99442 PR PHYS/QHP TELEPHONE EVALUATION 11-20 MIN: CPT | Performed by: PHYSICIAN ASSISTANT

## 2022-10-20 RX ORDER — CLONIDINE HYDROCHLORIDE 0.1 MG/1
TABLET ORAL
COMMUNITY
Start: 2022-10-14

## 2022-10-20 RX ORDER — PANTOPRAZOLE SODIUM 20 MG/1
20 TABLET, DELAYED RELEASE ORAL DAILY
Qty: 90 TABLET | Refills: 3 | Status: SHIPPED | OUTPATIENT
Start: 2022-10-20

## 2022-10-20 RX ORDER — FAMOTIDINE 40 MG/1
40 TABLET, FILM COATED ORAL DAILY PRN
COMMUNITY
End: 2023-02-07

## 2022-10-20 NOTE — PROGRESS NOTES
"     Follow Up Note     Date: 10/20/2022   Patient Name: Emily Tracey  MRN: [id number]  : 1985     Primary Care Provider: Marly Britt APRN     Chief Complaint   Patient presents with   • Colonoscopy   • Abdominal Pain   • Diarrhea     You have chosen to receive care through a telephone visit. Do you consent to use a telephone visit for your medical care today? Yes    History of present illness:   10/20/2022  Emily Tracey is a 37 y.o. female who is here today (on telephone visit) for follow up regarding Colonoscopy, abdominal pain, diarrhea.     Nausea is constant still. Has fluctuation of stool consistencies, unchanged since colonoscopy. Still with left sided abdominal pain, comes on suddenly. All symptoms are worse with intake of fiber or dairy. She would like to speak with a dietician regarding her diet recommendations. She has been eating less due to symptoms, has been on low fiber diet and eating small portions. Had colonoscopy since last visit and would like to discuss those results.     She has not been taking the protonix, only the pepcid PRN heartburn. She does not like taking luna medications.     Interval History:  2022  She feels that she could have Crohn's disease. Lost 100 lbs over the past 5 years. She has small \"ribbon like\" stools. Has mucous in stools. Nausea is present daily. Has intermittent vomiting. Has had black stools intermittently as well as bright red rectal bleeding at times. Has LUQ abdominal pain which is worse after eating. She was seen in the ED at [facility] due to passing large blood vaginal (thought was decidual cast). Pain in the LUQ radiates downward into the LLQ. She has noticed worse pain that around the time of her menstrual cycle, she will have diarrhea after a period of feeling very constipated. Her bowel habits have fluctuated in consistency for years, at least the past 6 years.      Has noticed an occasional sensation of dysphagia. Has sensation that " there is a lump and points to the right side of her neck. Had thyroid ultrasound which was normal recently. Has heartburn 3-4 times per week. She takes famotidine 20 mg 3 times weekly PRN heartburn. Previously took zantac for years.      No prior history of liver disease. Had labs recently by PCP and vitamin B12 was low. Maternal uncle with esophageal cancer. There is no known family history of colon cancer or colon polyps. No family history of IBD.    Subjective     Past Medical History:   Diagnosis Date   • Abdominal pain    • Anxiety    • Asthma    • Backache    • Blood in stool    • Colon polyp    • COVID-19 vaccine series completed     with booster   • Depression 2001   • Diverticulitis of colon    • Edema    • Endometriosis    • GERD (gastroesophageal reflux disease)    • Kidney infection    • Low back pain    • LUQ pain    • Migraine headache    • Ovarian cyst    • Pain and swelling of right lower leg    • Panic attacks    • Sacroiliac joint pain    • Suspicious nevus    • Tattoo    • Urgency of urination    • Vaginitis    • Wears glasses      Past Surgical History:   Procedure Laterality Date   • COLONOSCOPY     • COLONOSCOPY N/A 8/16/2022    Procedure: COLONOSCOPY CPT CODE: 92276;  Surgeon: Johnna Howell MD;  Location: [facility] ENDOSCOPY;  Service: Gastroenterology;  Laterality: N/A;   • ENDOSCOPY N/A 6/14/2022    Procedure: ESOPHAGOGASTRODUODENOSCOPY WITH BIOPSY;  Surgeon: Johnna Howell MD;  Location: [facility] ENDOSCOPY;  Service: Gastroenterology;  Laterality: N/A;   • OTHER SURGICAL HISTORY  2001    Laparoscopic excision uterine surface endometriotic tissue x2   • WISDOM TOOTH EXTRACTION       Family History   Problem Relation Age of Onset   • Cancer Mother    • COPD Mother    • Heart attack Father    • Esophageal cancer Maternal Uncle    • Heart attack Other         Grandparent   • Arthritis Other         Grandparent   • Cancer Other         Grandparent   • Diabetes Other          Grandparent   • Migraines Other         Grandparent   • Osteoporosis Other    • Colon cancer Neg Hx      Social History     Socioeconomic History   • Marital status:    Tobacco Use   • Smoking status: Former     Types: Cigarettes     Quit date: 2013     Years since quittin.8   • Smokeless tobacco: Never   Vaping Use   • Vaping Use: Never used   Substance and Sexual Activity   • Alcohol use: Yes     Alcohol/week: 1.0 standard drink     Types: 1 Glasses of wine per week     Comment: occas.   • Drug use: No   • Sexual activity: Defer     Current Outpatient Medications:   •  albuterol (PROVENTIL HFA;VENTOLIN HFA) 108 (90 BASE) MCG/ACT inhaler, 2 puffs 4 (four) times a day as needed., Disp: , Rfl:   •  cloNIDine (CATAPRES) 0.1 MG tablet, , Disp: , Rfl:   •  cyclobenzaprine (FLEXERIL) 5 MG tablet, Take 5 mg by mouth Daily As Needed., Disp: , Rfl:   •  famotidine (Pepcid) 40 MG tablet, Take 1 tablet by mouth Daily As Needed for Heartburn (can take up to 2 times daily PRN)., Disp: 60 tablet, Rfl: 5  •  fluticasone-salmeterol (ADVAIR) 500-50 MCG/DOSE DISKUS, 1 puff 2 (two) times a day., Disp: , Rfl:   •  ipratropium-albuterol (DUO-NEB) 0.5-2.5 mg/3 ml nebulizer, INHALE ONE VIAL (3ML) VIA NEBULIZER FOUR TIMES DAILY, Disp: , Rfl:   •  ondansetron ODT (ZOFRAN-ODT) 4 MG disintegrating tablet, Place 1 tablet on the tongue Every 8 (Eight) Hours As Needed for Nausea or Vomiting., Disp: 42 tablet, Rfl: 3  •  cyanocobalamin 1000 MCG/ML injection, Inject 1,000 mcg into the appropriate muscle as directed by prescriber 1 (One) Time Per Week. Every other week, Disp: , Rfl:   •  fluticasone (FLONASE) 50 MCG/ACT nasal spray, USE 1 SPRAY BY NASAL ROUTE DAILY, Disp: , Rfl:   •  medroxyPROGESTERone (Provera) 10 MG tablet, Take 1 tablet by mouth Daily., Disp: 30 tablet, Rfl: 3  •  pantoprazole (PROTONIX) 40 MG EC tablet, Take 1 tablet by mouth Daily., Disp: 30 tablet, Rfl: 5  •  polyethylene glycol (MIRALAX) 17 GM/SCOOP powder,  Follow directions given at office, Disp: 238 g, Rfl: 0    Allergies   Allergen Reactions   • Hydroxyzine Hives   • Lamotrigine Rash     The following portions of the patient's history were reviewed and updated as appropriate: allergies, current medications, past family history, past medical history, past social history, past surgical history and problem list.    Objective     Physical Exam  HENT:      Head:      Comments: Voice normal  Pulmonary:      Effort: No respiratory distress.   Neurological:      Mental Status: She is alert and oriented to person, place, and time.   Psychiatric:         Thought Content: Thought content normal.         Judgment: Judgment normal.       There were no vitals filed for this visit. - telephone visit    Results Review:   I reviewed the patient's new clinical results.    Office Visit on 07/27/2022   Component Date Value Ref Range Status   • WBC 07/27/2022 5.87  3.40 - 10.80 10*3/mm3 Final   • RBC 07/27/2022 4.12  3.77 - 5.28 10*6/mm3 Final   • Hemoglobin 07/27/2022 13.4  12.0 - 15.9 g/dL Final   • Hematocrit 07/27/2022 40.6  34.0 - 46.6 % Final   • MCV 07/27/2022 98.5 (H)  79.0 - 97.0 fL Final   • MCH 07/27/2022 32.5  26.6 - 33.0 pg Final   • MCHC 07/27/2022 33.0  31.5 - 35.7 g/dL Final   • RDW 07/27/2022 12.2 (L)  12.3 - 15.4 % Final   • Platelets 07/27/2022 248  140 - 450 10*3/mm3 Final   • Testosterone, Total 07/27/2022 25  8 - 60 ng/dL Final   • Testosterone, Free 07/27/2022 0.9  0.0 - 4.2 pg/mL Final   • TSH 07/27/2022 1.180  0.270 - 4.200 uIU/mL Final   • Free T4 07/27/2022 1.26  0.93 - 1.70 ng/dL Final    Results may be falsely increased if patient taking Biotin.   • T3, Free 07/27/2022 3.3  2.0 - 4.4 pg/mL Final      CTAP with contrast 5/2022 [facility]- gb normal, no stones, diverticulosis of sigmoid, no diverticulitis. Appendix normal. No acute process.     EGD was completed by Dr. Howell on 6/14/2022:  - The oropharynx was normal.  - The Z-line was irregular and was found  39 cm from the incisors.  - A 2 cm hiatal hernia was present.  - The esophagus and gastroesophageal junction were examined with white light and narrow band imaging (NBI). There were esophageal mucosal changes suspicious for long-segment Chatman's esophagus, classified as Chatman's stage C3-M4 per Hurley criteria. These changes involved the mucosa extending to the Z-line. Diffuse salmon-colored mucosa was present from 35 to 39 cm and no visible abnormalities were present. The maximum longitudinal extent of these esophageal mucosal changes was 4 cm in length. Mucosa was biopsied with a cold forceps for histology randomly at intervals of 2 cm in the lower third of the esophagus. One specimen bottle was sent to pathology.  - Patchy mildly erythematous mucosa without bleeding was found on the posterior wall of the stomach and in the gastric antrum. Biopsies were taken with a cold forceps for Helicobacter pylori testing.  - The duodenal bulb, first portion of the duodenum, second portion of the duodenum and third portion of the duodenum were normal. Biopsies for histology were taken with a cold forceps for evaluation of celiac disease.  - No endoscopic abnormality was evident in the esophagus to explain the patient's complaint of dysphagia. Biopsies were obtained from the proximal and distal esophagus with cold forceps for histology for eosinophilic esophagitis.  Pathology DIAGNOSIS:   A.   DUODENUM, BIOPSY:   Small bowel mucosa with no significant pathologic abnormality   B.   ANTRUM AND BODY, BIOPSY:   Antral type gastric mucosa with reactive gastropathy   Body type gastric mucosa with no significant pathologic abnormality   No Helicobacter pylori like organisms identified on H&E stained slide   No intestinal metaplasia or dysplasia identified   C.   ESOPHAGUS, BIOPSY, LOWER:   Squamocolumnar junctional mucosa with intestinal metaplasia; findings compatible with Chatman's esophagus   No evidence of dysplasia identified  "  D.   ESOPHAGUS, BIOPSY, DISTAL, MID AND PROXIMAL:   Squamous mucosa with mild reactive/reflux related changes   No increased intraepithelial eosinophils, intestinal metaplasia or dysplasia identified    Colonoscopy by Dr. Howell 8/16/2022:  Preparation of the colon was fair.  - Perianal skin tags found on perianal exam.  - One 3 to 4 mm polyp in the cecum, removed with a cold snare. Resected and retrieved.  - One 6 to 8 mm polyp in the proximal ascending colon, removed with a cold snare. Resected and retrieved.  - Non-bleeding internal/ external hemorrhoids.  - The examined portion of the ileum was normal. Biopsied.  - Biopsies were taken with a cold forceps from the right colon, left colon and transverse colon for evaluation of microscopic colitis.  Pathology DIAGNOSIS:   A.   TERMINAL ILEUM BIOPSY:   Ileal mucosa with no significant histopathologic abnormality   Negative for dysplasia or carcinoma  B.   COLON, BIOPSIES, LEFT, RIGHT, AND TRANSVERSE:   Colonic mucosa with no significant histopathologic abnormality   No evidence of dysplasia, carcinoma, or microscopic colitis   C.   CECUM POLYP:   Tubular adenoma   Negative for carcinoma or high-grade dysplasia   D.   ASCENDING COLON BIOPSY:   Fragments of tubular adenoma   Negative for carcinoma or high-grade dysplasia     Assessment / Plan      1. Irritable bowel syndrome with diarrhea  2. LUQ abdominal pain  3. Weight loss, abnormal  LUQ abdominal pain has been present and worsening for the past several years. Pain becomes worse after eating, especially worse with high fiber or dairy Pain in the LUQ radiates downward into the LLQ. Has worse pain that around the time of her menstrual cycle, she will have diarrhea after a period of feeling very constipated. Her bowel habits have fluctuated in consistency for years, at least the past 6 years but prior to that were occurring daily. She has small \"ribbon like\" stools, mucous in stools and intermittent bright red " rectal bleeding. Lost 100 lbs over the past 5 years. Had labs recently by PCP and vitamin B12 was low. CTAP 5/2022 with contrast at  [facility] reviewed today from chart and findings normal.      EGD 6/2022 showed EGD 6/2022 showed Chatman's esophagus, 2 cm hiatal hernia, patchy mild erythema in the stomach, normal duodenum. Pathology benign other than Chatman's. No celiac disease. Colonoscopy 8/2022 showed no endoscopic signs of colitis or ileitis. TI biopsy and random colon biopsies normal. Findings consistent with IBS.     Previous EGD 2015 showed food residue in the stomach and findings concerning for Chatman's (path negative). Colonoscopy 2015 showed focal areas of erythema in the terminal ileum (path normal), normal random colon biopsies and proctitis (path described ischemia or NSAID effect).      Zofran PRN nausea  Low dose PPI daily  Start Xifaxan 550 mg TID x 14 days for IBS-D  Low fiber and nondairy diet  Referral placed to dietician at her request    - Ambulatory Referral to Nutrition Services    - riFAXIMin (XIFAXAN) 550 MG tablet; Take 1 tablet by mouth 3 (Three) Times a Day.  Dispense: 42 tablet; Refill: 0    4. Gastroesophageal reflux disease without esophagitis  5. Chatman's esophagus without dysplasia  6. Esophageal dysphagia  Still having occasional dysphagia. Has sensation that there is a lump and points to the right side of her neck. Had thyroid ultrasound which was normal recently. Also reports sensation that food is getting stuck and points to lower mid chest area. Has heartburn most recently only about 1-2 times per week. She is currently only taking famotidine 20 mg PRN heartburn. Previously took zantac for years. Nausea is present daily. Has intermittent vomiting.     EGD 6/2022 showed long segment (4 cm in length) Chatman's esophagus, 2 cm hiatal hernia, patchy mild erythema in the stomach, normal duodenum, no endoscopic esophageal abnormality to explain patient's dysphagia.       Acid reflux  measures  Re-start Protonix, will lower dose to 20 mg once daily (long term due to Chatman's)  Can continue famotidine PRN heartburn  Avoid NSAIDs  Dietary changes and lifestyle changes including losing weight  Repeat EGD 2 years, due 6/2024    - pantoprazole (PROTONIX) 20 MG EC tablet; Take 1 tablet by mouth Daily.  Dispense: 90 tablet; Refill: 3                                  Prior history:  Family history of esophageal cancer  Maternal uncle with esophageal cancer.       This visit has been rescheduled as a phone visit to comply with patient safety concerns in accordance with CDC recommendations. Total time of discussion was 18 minutes.    Emily Tracey verbally consented to a telehealth visit. Emily was seen via telehealth using real-time telephone conferencing technology by Darcie Cabrales PA-C. This visit was requested because of the COVID-19 pandemic. Emily was located at her home in [place], [place], and Keron was located at [facility] in [place], [place]. Emily and Keron participated in the telehealth visit. The telehealth visit started 3:32 PM and ended at 3:50 PM.    Follow Up:   Return in about 3 months (around 1/20/2023) for recheck IBS diarrhea.      Darcie Cabrales PA-C  [facility]  10/20/2022  15:58 EDT    Dictated Utilizing Dragon Dictation: Part of this note may be an electronic transcription/translation of spoken language to printed text using the Dragon Dictation System.

## 2022-10-27 ENCOUNTER — PRIOR AUTHORIZATION (OUTPATIENT)
Dept: GASTROENTEROLOGY | Facility: CLINIC | Age: 37
End: 2022-10-27

## 2022-11-28 RX ORDER — MEDROXYPROGESTERONE ACETATE 10 MG/1
TABLET ORAL
Qty: 90 TABLET | Refills: 1 | OUTPATIENT
Start: 2022-11-28

## 2023-01-16 ENCOUNTER — OFFICE VISIT (OUTPATIENT)
Dept: GASTROENTEROLOGY | Facility: CLINIC | Age: 38
End: 2023-01-16
Payer: MEDICAID

## 2023-01-16 VITALS — WEIGHT: 262 LBS | BODY MASS INDEX: 42.11 KG/M2 | HEIGHT: 66 IN

## 2023-01-16 DIAGNOSIS — K58.0 IRRITABLE BOWEL SYNDROME WITH DIARRHEA: ICD-10-CM

## 2023-01-16 DIAGNOSIS — R19.7 DIARRHEA, UNSPECIFIED TYPE: ICD-10-CM

## 2023-01-16 DIAGNOSIS — R63.4 WEIGHT LOSS, ABNORMAL: ICD-10-CM

## 2023-01-16 DIAGNOSIS — R11.0 NAUSEA: Primary | Chronic | ICD-10-CM

## 2023-01-16 DIAGNOSIS — R10.12 LUQ ABDOMINAL PAIN: Chronic | ICD-10-CM

## 2023-01-16 DIAGNOSIS — R19.5 DARK STOOLS: ICD-10-CM

## 2023-01-16 PROCEDURE — 99442 PR PHYS/QHP TELEPHONE EVALUATION 11-20 MIN: CPT | Performed by: PHYSICIAN ASSISTANT

## 2023-01-16 RX ORDER — ERGOCALCIFEROL 1.25 MG/1
CAPSULE ORAL
COMMUNITY
Start: 2022-11-14

## 2023-01-16 NOTE — LETTER
January 16, 2023     Patient: Emily Tracey   YOB: 1985   Date of Visit: 1/16/2023       To Whom It May Concern:    Emily Tracey has been seen in our office for ongoing gastrointestinal complaints. She is undergoing further evaluation and management of her complaints which have been persistent.          Sincerely,        Electronically signed 1/16/2023 15:46 CHANO Cabrales PA-C  [facility]

## 2023-01-16 NOTE — PATIENT INSTRUCTIONS
Small portions, low fiber diet recommended  Zofran as needed for nausea  CTAP with oral and IV contrast will be arranged  Complete stool testing as ordered

## 2023-02-02 ENCOUNTER — HOSPITAL ENCOUNTER (OUTPATIENT)
Dept: CT IMAGING | Facility: HOSPITAL | Age: 38
Discharge: HOME | End: 2023-02-02
Admitting: PHYSICIAN ASSISTANT
Payer: MEDICAID

## 2023-02-02 DIAGNOSIS — R10.12 LUQ ABDOMINAL PAIN: Chronic | ICD-10-CM

## 2023-02-02 DIAGNOSIS — R11.0 NAUSEA: Chronic | ICD-10-CM

## 2023-02-02 DIAGNOSIS — R63.4 WEIGHT LOSS, ABNORMAL: ICD-10-CM

## 2023-02-02 DIAGNOSIS — R19.5 DARK STOOLS: ICD-10-CM

## 2023-02-02 DIAGNOSIS — R19.7 DIARRHEA, UNSPECIFIED TYPE: ICD-10-CM

## 2023-02-02 PROCEDURE — 74177 CT ABD & PELVIS W/CONTRAST: CPT

## 2023-02-02 PROCEDURE — 25010000002 IOPAMIDOL 61 % SOLUTION: Performed by: PHYSICIAN ASSISTANT

## 2023-02-02 RX ADMIN — IOPAMIDOL 100 ML: 612 INJECTION, SOLUTION INTRAVENOUS at 19:28

## 2023-02-05 DIAGNOSIS — K21.9 GASTRO-ESOPHAGEAL REFLUX DISEASE WITHOUT ESOPHAGITIS: ICD-10-CM

## 2023-02-07 RX ORDER — FAMOTIDINE 40 MG/1
TABLET, FILM COATED ORAL
Qty: 180 TABLET | Refills: 1 | Status: SHIPPED | OUTPATIENT
Start: 2023-02-07

## 2023-03-13 DIAGNOSIS — R11.0 NAUSEA: ICD-10-CM

## 2023-03-13 RX ORDER — ONDANSETRON 4 MG/1
4 TABLET, ORALLY DISINTEGRATING ORAL EVERY 8 HOURS PRN
Qty: 42 TABLET | Refills: 3 | Status: SHIPPED | OUTPATIENT
Start: 2023-03-13

## 2023-03-21 PROCEDURE — 87507 IADNA-DNA/RNA PROBE TQ 12-25: CPT | Performed by: PHYSICIAN ASSISTANT

## 2023-03-21 PROCEDURE — 82653 EL-1 FECAL QUANTITATIVE: CPT | Performed by: PHYSICIAN ASSISTANT

## 2023-03-21 PROCEDURE — 87493 C DIFF AMPLIFIED PROBE: CPT | Performed by: PHYSICIAN ASSISTANT

## 2023-03-21 PROCEDURE — 83993 ASSAY FOR CALPROTECTIN FECAL: CPT | Performed by: PHYSICIAN ASSISTANT

## 2023-03-28 ENCOUNTER — PATIENT MESSAGE (OUTPATIENT)
Dept: GASTROENTEROLOGY | Facility: CLINIC | Age: 38
End: 2023-03-28
Payer: MEDICAID

## 2023-03-28 DIAGNOSIS — K64.9 HEMORRHOIDS, UNSPECIFIED HEMORRHOID TYPE: ICD-10-CM

## 2023-03-28 DIAGNOSIS — K86.81 EXOCRINE PANCREATIC INSUFFICIENCY: Primary | ICD-10-CM

## 2023-03-28 DIAGNOSIS — K62.89 ANAL OR RECTAL PAIN: ICD-10-CM

## 2023-03-28 RX ORDER — PANCRELIPASE 36000; 180000; 114000 [USP'U]/1; [USP'U]/1; [USP'U]/1
CAPSULE, DELAYED RELEASE PELLETS ORAL
Qty: 240 CAPSULE | Refills: 3 | Status: SHIPPED | OUTPATIENT
Start: 2023-03-28

## 2023-03-29 RX ORDER — LIDOCAINE HYDROCHLORIDE AND HYDROCORTISONE ACETATE 30; 5 MG/G; MG/G
1 CREAM RECTAL 2 TIMES DAILY PRN
Qty: 85 G | Refills: 3 | Status: SHIPPED | OUTPATIENT
Start: 2023-03-29

## 2023-04-10 NOTE — PROGRESS NOTES
"Subjective   Emily Tracey is a 37 y.o. female.   Chief Complaint   Patient presents with   • Hemorrhoids       History of Present Illness   Ms. Tracey is a 37-year-old female patient who comes the office for evaluation and management of \"hemorrhoids.\"  She states that she felt that she had a thrombosed hemorrhoid approximately 1 week ago.  She reports no bleeding, but states that she did have some mucus drainage from the anal area.  She complains of severe pain, especially with defecation.    The following portions of the patient's history were reviewed and updated as appropriate: allergies, current medications, past family history, past medical history, past social history, past surgical history and problem list.     Patient Active Problem List   Diagnosis   • Gastroesophageal reflux disease   • Atopic rhinitis   • Arthritis   • Diarrhea   • Endometriosis   • Edema   • Left upper quadrant pain   • Lumbar radiculopathy   • Nausea   • Premenstrual tension syndrome   • Sacroiliac joint pain   • Depression   • Anxiety   • Asthma   • Migraine   • Ovarian cyst   • Panic attacks   • Change in bowel habits   • Esophageal dysphagia       Past Medical History:   Diagnosis Date   • Abdominal pain    • Anxiety    • Asthma    • Backache    • Blood in stool    • Colon polyp    • COVID-19 vaccine series completed     with booster   • Depression 2001   • Diverticulitis of colon    • Edema    • Endometriosis    • GERD (gastroesophageal reflux disease)    • Kidney infection    • Low back pain    • LUQ pain    • Migraine headache    • Ovarian cyst    • Pain and swelling of right lower leg    • Panic attacks    • Sacroiliac joint pain    • Suspicious nevus    • Tattoo    • Urgency of urination    • Vaginitis    • Wears glasses        Past Surgical History:   Procedure Laterality Date   • COLONOSCOPY     • COLONOSCOPY N/A 8/16/2022    Procedure: COLONOSCOPY CPT CODE: 09812;  Surgeon: Johnna Howell MD;  Location: [facility]" ENDOSCOPY;  Service: Gastroenterology;  Laterality: N/A;   • ENDOSCOPY N/A 6/14/2022    Procedure: ESOPHAGOGASTRODUODENOSCOPY WITH BIOPSY;  Surgeon: Johnna Howell MD;  Location: [facility] ENDOSCOPY;  Service: Gastroenterology;  Laterality: N/A;   • OTHER SURGICAL HISTORY  2001    Laparoscopic excision uterine surface endometriotic tissue x2   • WISDOM TOOTH EXTRACTION         Medications:     Current Outpatient Medications:   •  albuterol (PROVENTIL HFA;VENTOLIN HFA) 108 (90 BASE) MCG/ACT inhaler, 2 puffs 4 (Four) Times a Day As Needed., Disp: , Rfl:   •  cyclobenzaprine (FLEXERIL) 5 MG tablet, Take 1 tablet by mouth Daily As Needed., Disp: , Rfl:   •  famotidine (PEPCID) 40 MG tablet, TAKE 1 TABLET BY MOUTH DAILY AS NEEDED FOR HEARTBURN (CAN TAKE UP TO 2 TIMES DAILY AS NEEDED), Disp: 180 tablet, Rfl: 1  •  fluticasone-salmeterol (ADVAIR) 500-50 MCG/DOSE DISKUS, 1 puff 2 (Two) Times a Day., Disp: , Rfl:   •  ipratropium-albuterol (DUO-NEB) 0.5-2.5 mg/3 ml nebulizer, INHALE ONE VIAL (3ML) VIA NEBULIZER FOUR TIMES DAILY, Disp: , Rfl:   •  Lidocaine-Hydrocort, Perianal, 3-0.5 % cream rectal cream, Insert 1 application into the rectum 2 (Two) Times a Day As Needed (rectal pain)., Disp: 85 g, Rfl: 3  •  ondansetron ODT (ZOFRAN-ODT) 4 MG disintegrating tablet, PLACE 1 TABLET ON THE TONGUE EVERY 8 (EIGHT) HOURS AS NEEDED FOR NAUSEA OR VOMITING., Disp: 42 tablet, Rfl: 3  •  Pancrelipase, Lip-Prot-Amyl, (Creon) 88249-728878 units capsule delayed-release particles capsule, Take 2 caps with meals and 1 with snacks., Disp: 240 capsule, Rfl: 3  •  pantoprazole (PROTONIX) 20 MG EC tablet, Take 1 tablet by mouth Daily., Disp: 90 tablet, Rfl: 3  •  vitamin D (ERGOCALCIFEROL) 1.25 MG (16314 UT) capsule capsule, TAKE 1 CAPSULE BY MOUTH ONE TIME PER WEEK FOR 30 DAYS, Disp: , Rfl:   •  cloNIDine (CATAPRES) 0.1 MG tablet, , Disp: , Rfl:     Allergies:   Allergies   Allergen Reactions   • Hydroxyzine Hives   • Lamotrigine Rash          Family History   Problem Relation Age of Onset   • Cancer Mother    • COPD Mother    • Heart attack Father    • Esophageal cancer Maternal Uncle    • Heart attack Other         Grandparent   • Arthritis Other         Grandparent   • Cancer Other         Grandparent   • Diabetes Other         Grandparent   • Migraines Other         Grandparent   • Osteoporosis Other    • Colon cancer Neg Hx        Social History     Socioeconomic History   • Marital status:    Tobacco Use   • Smoking status: Former     Types: Cigarettes     Quit date: 1/1/2013     Years since quitting: 10.2   • Smokeless tobacco: Never   Vaping Use   • Vaping Use: Never used   Substance and Sexual Activity   • Alcohol use: Yes     Alcohol/week: 1.0 standard drink     Types: 1 Glasses of wine per week     Comment: occas.   • Drug use: No   • Sexual activity: Defer         Review of Systems   Constitutional: Negative for chills, fever and unexpected weight change.   HENT: Negative for hearing loss, trouble swallowing and voice change.    Eyes: Negative for visual disturbance.   Respiratory: Negative for apnea, cough, chest tightness, shortness of breath and wheezing.    Cardiovascular: Negative for chest pain, palpitations and leg swelling.   Gastrointestinal: Positive for rectal pain. Negative for abdominal distention, abdominal pain, anal bleeding, blood in stool, constipation, diarrhea, nausea and vomiting.   Endocrine: Negative for cold intolerance and heat intolerance.   Genitourinary: Negative for difficulty urinating, dysuria and flank pain.   Musculoskeletal: Negative for back pain and gait problem.   Skin: Negative for color change, rash and wound.   Neurological: Negative for dizziness, syncope, speech difficulty, weakness, light-headedness, numbness and headaches.   Hematological: Negative for adenopathy. Does not bruise/bleed easily.   Psychiatric/Behavioral: Negative for confusion. The patient is not nervous/anxious.    I  "have reviewed and confirmed the accuracy of the ROS as documented by the MA/LPN/RN Sarah Varma MD        Objective    /80   Pulse 95   Temp 98.1 °F (36.7 °C) (Temporal)   Resp 18   Ht 167.6 cm (66\")   Wt 116 kg (255 lb 12.8 oz)   SpO2 96%   BMI 41.29 kg/m²     Physical Exam  Constitutional:       Appearance: She is well-developed. She is obese.   HENT:      Head: Normocephalic and atraumatic.   Eyes:      General: No scleral icterus.  Cardiovascular:      Rate and Rhythm: Regular rhythm.   Pulmonary:      Effort: Pulmonary effort is normal.   Abdominal:      General: There is no distension.      Palpations: Abdomen is soft.      Tenderness: There is no abdominal tenderness.   Genitourinary:     Comments: Digital rectal exam performed with the patient experiencing extreme pain.  No evidence of a thrombosed hemorrhoid seen.  No fistulas or abscesses noted.  Although no definitive anal fissure is identified, the exam would be consistent with the same.  Musculoskeletal:      Cervical back: Neck supple.   Skin:     General: Skin is warm and dry.   Neurological:      Mental Status: She is alert and oriented to person, place, and time.   Psychiatric:         Behavior: Behavior normal.         Assessment & Plan   Diagnoses and all orders for this visit:    1. Anal fissure, unspecified (Primary)  -     lidocaine (XYLOCAINE) 5 % ointment; Apply 1 application topically to the appropriate area as directed Every 2 (Two) Hours As Needed for Mild Pain.  Dispense: 50 g; Refill: 0  -     Emollient (Ointment Base) ointment; Nifedipine ointment 0.3%. Apply small amount to rectum 2-3 times per day  Dispense: 30 g; Refill: 0      I discussed with Ms. Tracey and I strongly suspect that she has an anal fissure.  Although I am not able to confidently identify a fissure on exam, her exam resulted in such extreme pain that this in combination with her history would certainly be consistent with a fissure.  I explained to " the patient that it is my experience that many patients with anal fissures are unable to tolerate a digital rectal exam, and this would be true for her as well.  I recommended a course of treatment for a suspected anal fissure with an office follow-up.  She was agreeable.  I have started her on a regimen of topical lidocaine ointment, and nifedipine ointment.  We also discussed the importance of controlling any constipation that may occur.  I will plan to see her back for reevaluation iIn 1 month.

## 2023-04-12 ENCOUNTER — OFFICE VISIT (OUTPATIENT)
Dept: SURGERY | Facility: CLINIC | Age: 38
End: 2023-04-12
Payer: MEDICAID

## 2023-04-12 ENCOUNTER — TELEPHONE (OUTPATIENT)
Dept: SURGERY | Facility: CLINIC | Age: 38
End: 2023-04-12

## 2023-04-12 VITALS
SYSTOLIC BLOOD PRESSURE: 126 MMHG | OXYGEN SATURATION: 96 % | HEIGHT: 66 IN | HEART RATE: 95 BPM | DIASTOLIC BLOOD PRESSURE: 80 MMHG | BODY MASS INDEX: 41.11 KG/M2 | RESPIRATION RATE: 18 BRPM | TEMPERATURE: 98.1 F | WEIGHT: 255.8 LBS

## 2023-04-12 DIAGNOSIS — K60.2 ANAL FISSURE, UNSPECIFIED: Primary | ICD-10-CM

## 2023-04-12 RX ORDER — HYDROPHILIC CREAM
OINTMENT (GRAM) TOPICAL
Qty: 30 G | Refills: 0 | Status: SHIPPED | OUTPATIENT
Start: 2023-04-12

## 2023-04-12 RX ORDER — LIDOCAINE 50 MG/G
1 OINTMENT TOPICAL
Qty: 50 G | Refills: 0 | Status: SHIPPED | OUTPATIENT
Start: 2023-04-12

## 2023-04-18 ENCOUNTER — OFFICE VISIT (OUTPATIENT)
Dept: GASTROENTEROLOGY | Facility: CLINIC | Age: 38
End: 2023-04-18
Payer: MEDICAID

## 2023-04-18 VITALS
OXYGEN SATURATION: 96 % | SYSTOLIC BLOOD PRESSURE: 122 MMHG | WEIGHT: 259 LBS | DIASTOLIC BLOOD PRESSURE: 84 MMHG | HEART RATE: 85 BPM | BODY MASS INDEX: 41.8 KG/M2

## 2023-04-18 DIAGNOSIS — K86.81 EXOCRINE PANCREATIC INSUFFICIENCY: ICD-10-CM

## 2023-04-18 DIAGNOSIS — R19.5 ABNORMAL STOOLS: ICD-10-CM

## 2023-04-18 DIAGNOSIS — R19.7 DIARRHEA, UNSPECIFIED TYPE: ICD-10-CM

## 2023-04-18 DIAGNOSIS — R63.4 WEIGHT LOSS, ABNORMAL: ICD-10-CM

## 2023-04-18 DIAGNOSIS — R19.5 DARK STOOLS: Primary | ICD-10-CM

## 2023-04-18 PROCEDURE — 1159F MED LIST DOCD IN RCRD: CPT | Performed by: PHYSICIAN ASSISTANT

## 2023-04-18 PROCEDURE — 1160F RVW MEDS BY RX/DR IN RCRD: CPT | Performed by: PHYSICIAN ASSISTANT

## 2023-04-18 PROCEDURE — 99214 OFFICE O/P EST MOD 30 MIN: CPT | Performed by: PHYSICIAN ASSISTANT

## 2023-04-18 RX ORDER — PANCRELIPASE 36000; 180000; 114000 [USP'U]/1; [USP'U]/1; [USP'U]/1
CAPSULE, DELAYED RELEASE PELLETS ORAL
Qty: 240 CAPSULE | Refills: 3 | Status: SHIPPED | OUTPATIENT
Start: 2023-04-18

## 2023-04-18 NOTE — PROGRESS NOTES
"     Follow Up Note     Date: 2023   Patient Name: Emily Tracey  MRN: [id number]  : 1985     Primary Care Provider: Provider, No Known     Chief Complaint   Patient presents with   • Irritable Bowel Syndrome   • Nausea   • Results     CT and labs     History of present illness:   2023  Emily Tracey is a 37 y.o. female who is here today for follow up regarding Irritable Bowel Syndrome, Nausea, and Results (CT and labs).    Since last visit, she had stool testing and CT scan, would like to discuss those results. She has had more than 90% improvement in diarrhea since starting Creon 2 caps with meals and 1 with snacks. She had abnormal pancreatic elastase stool test and started Creon after that. She is now having 2+ stools per day, usually after eating. Stools are still ribbon like, still dark in color. She saw general surgery recently and was told she had anal fissures.     Interval History:  2022  She feels that she could have Crohn's disease. Lost 100 lbs over the past 5 years. She has small \"ribbon like\" stools. Has mucous in stools. Nausea is present daily. Has intermittent vomiting. Has had black stools intermittently as well as bright red rectal bleeding at times. Has LUQ abdominal pain which is worse after eating. She was seen in the ED at [facility] due to passing large blood vaginal (thought was decidual cast). Pain in the LUQ radiates downward into the LLQ. She has noticed worse pain that around the time of her menstrual cycle, she will have diarrhea after a period of feeling very constipated. Her bowel habits have fluctuated in consistency for years, at least the past 6 years.      Has noticed an occasional sensation of dysphagia. Has sensation that there is a lump and points to the right side of her neck. Had thyroid ultrasound which was normal recently. Has heartburn 3-4 times per week. She takes famotidine 20 mg 3 times weekly PRN heartburn. Previously took zantac for years. "      No prior history of liver disease. Had labs recently by PCP and vitamin B12 was low. Maternal uncle with esophageal cancer. There is no known family history of colon cancer or colon polyps. No family history of IBD.    Subjective     Past Medical History:   Diagnosis Date   • Abdominal pain    • Anxiety    • Asthma    • Backache    • Blood in stool    • Colon polyp    • COVID-19 vaccine series completed     with booster   • Depression 2001   • Diverticulitis of colon    • Edema    • Endometriosis    • GERD (gastroesophageal reflux disease)    • Kidney infection    • Low back pain    • LUQ pain    • Migraine headache    • Ovarian cyst    • Pain and swelling of right lower leg    • Panic attacks    • Sacroiliac joint pain    • Suspicious nevus    • Tattoo    • Urgency of urination    • Vaginitis    • Wears glasses      Past Surgical History:   Procedure Laterality Date   • COLONOSCOPY     • COLONOSCOPY N/A 08/16/2022    Procedure: COLONOSCOPY CPT CODE: 06679;  Surgeon: Johnna Howell MD;  Location: [facility] ENDOSCOPY;  Service: Gastroenterology;  Laterality: N/A;   • ENDOSCOPY N/A 06/14/2022    Procedure: ESOPHAGOGASTRODUODENOSCOPY WITH BIOPSY;  Surgeon: Johnna Howell MD;  Location: [facility] ENDOSCOPY;  Service: Gastroenterology;  Laterality: N/A;   • OTHER SURGICAL HISTORY  2001    Laparoscopic excision uterine surface endometriotic tissue x2   • UPPER GASTROINTESTINAL ENDOSCOPY     • WISDOM TOOTH EXTRACTION       Family History   Problem Relation Age of Onset   • Cancer Mother    • COPD Mother    • Heart attack Father    • Esophageal cancer Maternal Uncle    • Heart attack Other         Grandparent   • Arthritis Other         Grandparent   • Cancer Other         Grandparent   • Diabetes Other         Grandparent   • Migraines Other         Grandparent   • Osteoporosis Other    • Colon cancer Neg Hx      Social History     Socioeconomic History   • Marital status:    Tobacco Use   • Smoking  status: Former     Types: Cigarettes     Quit date: 1/1/2013     Years since quitting: 10.2   • Smokeless tobacco: Never   Vaping Use   • Vaping Use: Never used   Substance and Sexual Activity   • Alcohol use: Yes     Alcohol/week: 1.0 standard drink     Types: 1 Glasses of wine per week     Comment: occas.   • Drug use: No   • Sexual activity: Defer       Current Outpatient Medications:   •  albuterol (PROVENTIL HFA;VENTOLIN HFA) 108 (90 BASE) MCG/ACT inhaler, 2 puffs 4 (Four) Times a Day As Needed., Disp: , Rfl:   •  cyclobenzaprine (FLEXERIL) 5 MG tablet, Take 1 tablet by mouth Daily As Needed., Disp: , Rfl:   •  famotidine (PEPCID) 40 MG tablet, TAKE 1 TABLET BY MOUTH DAILY AS NEEDED FOR HEARTBURN (CAN TAKE UP TO 2 TIMES DAILY AS NEEDED), Disp: 180 tablet, Rfl: 1  •  fluticasone-salmeterol (ADVAIR) 500-50 MCG/DOSE DISKUS, 1 puff 2 (Two) Times a Day., Disp: , Rfl:   •  ipratropium-albuterol (DUO-NEB) 0.5-2.5 mg/3 ml nebulizer, INHALE ONE VIAL (3ML) VIA NEBULIZER FOUR TIMES DAILY, Disp: , Rfl:   •  ondansetron ODT (ZOFRAN-ODT) 4 MG disintegrating tablet, PLACE 1 TABLET ON THE TONGUE EVERY 8 (EIGHT) HOURS AS NEEDED FOR NAUSEA OR VOMITING., Disp: 42 tablet, Rfl: 3  •  Pancrelipase, Lip-Prot-Amyl, (Creon) 80141-544957 units capsule delayed-release particles capsule, Take 2 caps with meals and 1 with snacks., Disp: 240 capsule, Rfl: 3  •  pantoprazole (PROTONIX) 20 MG EC tablet, Take 1 tablet by mouth Daily., Disp: 90 tablet, Rfl: 3  •  vitamin D (ERGOCALCIFEROL) 1.25 MG (91553 UT) capsule capsule, TAKE 1 CAPSULE BY MOUTH ONE TIME PER WEEK FOR 30 DAYS, Disp: , Rfl:   •  cloNIDine (CATAPRES) 0.1 MG tablet, , Disp: , Rfl:   •  Emollient (Ointment Base) ointment, Nifedipine ointment 0.3%. Apply small amount to rectum 2-3 times per day (Patient not taking: Reported on 4/18/2023), Disp: 30 g, Rfl: 0  •  lidocaine (XYLOCAINE) 5 % ointment, Apply 1 application topically to the appropriate area as directed Every 2 (Two) Hours  As Needed for Mild Pain. (Patient not taking: Reported on 4/18/2023), Disp: 50 g, Rfl: 0  •  Lidocaine-Hydrocort, Perianal, 3-0.5 % cream rectal cream, Insert 1 application into the rectum 2 (Two) Times a Day As Needed (rectal pain). (Patient not taking: Reported on 4/18/2023), Disp: 85 g, Rfl: 3    Allergies   Allergen Reactions   • Hydroxyzine Hives   • Lamotrigine Rash     The following portions of the patient's history were reviewed and updated as appropriate: allergies, current medications, past family history, past medical history, past social history, past surgical history and problem list.    Objective     Physical Exam  Constitutional:       General: She is not in acute distress.     Appearance: Normal appearance. She is well-developed. She is obese. She is not diaphoretic.   HENT:      Head: Normocephalic and atraumatic.      Right Ear: External ear normal.      Left Ear: External ear normal.      Nose: Nose normal.   Eyes:      General: No scleral icterus.        Right eye: No discharge.         Left eye: No discharge.      Conjunctiva/sclera: Conjunctivae normal.   Neck:      Trachea: No tracheal deviation.   Pulmonary:      Effort: Pulmonary effort is normal. No respiratory distress.   Abdominal:      General: Bowel sounds are normal. There is no distension.      Palpations: Abdomen is soft. There is no mass.      Tenderness: There is abdominal tenderness (generalized, mild). There is no guarding.   Musculoskeletal:         General: Normal range of motion.      Cervical back: Normal range of motion.   Skin:     Coloration: Skin is not pale.      Findings: No erythema or rash.   Neurological:      Mental Status: She is alert and oriented to person, place, and time.      Coordination: Coordination normal.   Psychiatric:         Mood and Affect: Mood normal.         Behavior: Behavior normal.         Thought Content: Thought content normal.         Judgment: Judgment normal.       Vitals:    04/18/23 1452    BP: 122/84   Pulse: 85   SpO2: 96%   Weight: 117 kg (259 lb)     Results Review:   I reviewed the patient's new clinical results.    Results Encounter on 01/16/2023   Component Date Value Ref Range Status   • Calprotectin, Fecal 03/21/2023 20  0 - 120 ug/g Final    Concentration     Interpretation   Follow-Up  <16 - 50 ug/g     Normal           None  >50 -120 ug/g     Borderline       Re-evaluate in 4-6 weeks      >120 ug/g     Abnormal         Repeat as clinically                                     indicated   • C. Difficile Toxins by PCR 03/21/2023 Not Detected  Not Detected Final   • Campylobacter 03/21/2023 Not Detected  Not Detected Final   • Plesiomonas shigelloides 03/21/2023 Not Detected  Not Detected Final   • Salmonella 03/21/2023 Not Detected  Not Detected Final   • Vibrio 03/21/2023 Not Detected  Not Detected Final   • Vibrio cholerae 03/21/2023 Not Detected  Not Detected Final   • Yersinia enterocolitica 03/21/2023 Not Detected  Not Detected Final   • Enteroaggregative E. coli (EAEC) 03/21/2023 Not Detected  Not Detected Final   • Enteropathogenic E. coli (EPEC) 03/21/2023 Not Detected  Not Detected Final   • Enterotoxigenic E. coli (ETEC) lt/* 03/21/2023 Not Detected  Not Detected Final   • Shiga-like toxin-producing E. coli* 03/21/2023 Not Detected  Not Detected Final   • Shigella/Enteroinvasive E. coli (E* 03/21/2023 Not Detected  Not Detected Final   • Cryptosporidium 03/21/2023 Not Detected  Not Detected Final   • Cyclospora cayetanensis 03/21/2023 Not Detected  Not Detected Final   • Entamoeba histolytica 03/21/2023 Not Detected  Not Detected Final   • Giardia lamblia 03/21/2023 Not Detected  Not Detected Final   • Adenovirus F40/41 03/21/2023 Not Detected  Not Detected Final   • Astrovirus 03/21/2023 Not Detected  Not Detected Final   • Norovirus GI/GII 03/21/2023 Not Detected  Not Detected Final   • Rotavirus A 03/21/2023 Not Detected  Not Detected Final   • Sapovirus (I, II, IV or V)  "03/21/2023 Not Detected  Not Detected Final   • Pancreatic Fecal 03/21/2023 133 (L)  >200 ug Elast./g Final           Severe Pancreatic Insufficiency:          <100         Moderate Pancreatic Insufficiency:   100 - 200         Normal:                                   >200      CT Abdomen Pelvis With Contrast 2/3/2023  Possible left ovarian cyst, consider pelvic ultrasound.  Mild stool burden.       Assessment / Plan      1. Dark stools  2. Weight loss, abnormal  3. Diarrhea, unspecified type  4. Exocrine pancreatic insufficiency  Since starting Creon when stool test abnormal for EPI, all symptoms have improved. She still has diarrhea, bloating, belching, fecal urgency and abdominal pain but all improved now. Her bowel habits have fluctuated in consistency for years, at least the past >6 years but prior to that stools were occurring daily. Still has small \"ribbon like\" stools, mucous in stools and intermittent very dark stools. Lost 100 lbs over the past 5-6 years. Had labs by PCP and vitamin D and B12 were low. CTAP 2/2023 with contrast with mild stool burden, no other GI pathology. She took Xifaxan 550 mg TID x14 days for IBS-D without changes. Based on history and response to Creon, suspect overlap EPI and IBS-D.      EGD 6/2022 showed EGD 6/2022 showed Chatman's esophagus, 2 cm hiatal hernia, patchy mild erythema in the stomach, normal duodenum. Pathology benign other than Chatman's. No celiac disease. Colonoscopy 8/2022 showed no endoscopic signs of colitis or ileitis. TI biopsy and random colon biopsies normal. Previous EGD 2015 showed food residue in the stomach and findings concerning for Chatman's (path negative). Colonoscopy 2015 showed focal areas of erythema in the terminal ileum (path normal), normal random colon biopsies and proctitis (path described ischemia or NSAID effect).     Continue Zofran PRN nausea  Continue low dose PPI daily  Low fiber and nondairy diet  Small portions at meals  Continue " Creon as directed, refills given  Capsule endoscopy will be arranged pending insurance approval    - Pancrelipase, Lip-Prot-Amyl, (Creon) 40311-932745 units capsule delayed-release particles capsule; Take 2 caps with meals and 1 with snacks.  Dispense: 240 capsule; Refill: 3    - Endoscopy, GI With Capsule; Future       Prior history:  Family history of esophageal cancer  Chatman's esophagus without dysplasia  Maternal uncle with esophageal cancer.   EGD 6/2022 showed long segment (4 cm in length) Chatman's esophagus, 2 cm hiatal hernia, patchy mild erythema in the stomach, normal duodenum, no endoscopic esophageal abnormality to explain patient's dysphagia.    Repeat EGD 2 years, due 6/2024       Follow Up:   Return in about 3 months (around 7/18/2023) for recheck diarrhea.    Darcie Cabrales PA-C  [facility] [place]  4/18/2023  17:14 EDT    Dictated Utilizing Dragon Dictation: Part of this note may be an electronic transcription/translation of spoken language to printed text using the Dragon Dictation System.

## 2023-04-19 ENCOUNTER — TELEPHONE (OUTPATIENT)
Dept: GASTROENTEROLOGY | Facility: CLINIC | Age: 38
End: 2023-04-19
Payer: MEDICAID

## 2023-04-19 NOTE — TELEPHONE ENCOUNTER
----- Message from Aishwarya Bojorquez sent at 4/19/2023 11:07 AM EDT -----  Regarding: FW: pillcam  Approved -  start date 04/24/23 to 05/23/23    ----- Message -----  From: Darcie Cabrales PA-C  Sent: 4/18/2023   3:41 PM EDT  To: Aishwarya Bojorquez, Gabrielle Berman MA  Subject: [facility]                                          Ordered pillcam for this patient at her request, not sure if it will be covered, pt aware that it may not be. I discussed with Dr. Howell.

## 2023-04-27 ENCOUNTER — PROCEDURE VISIT (OUTPATIENT)
Dept: GASTROENTEROLOGY | Facility: CLINIC | Age: 38
End: 2023-04-27
Payer: MEDICAID

## 2023-04-27 VITALS
SYSTOLIC BLOOD PRESSURE: 132 MMHG | BODY MASS INDEX: 41.97 KG/M2 | DIASTOLIC BLOOD PRESSURE: 80 MMHG | WEIGHT: 260 LBS | OXYGEN SATURATION: 98 % | HEART RATE: 62 BPM

## 2023-04-27 DIAGNOSIS — R19.5 DARK STOOLS: Primary | ICD-10-CM

## 2023-04-27 DIAGNOSIS — R63.4 WEIGHT LOSS, ABNORMAL: ICD-10-CM

## 2023-04-27 NOTE — PROGRESS NOTES
Patient presented to office for Pill Cam Endoscopy. Received consent. Discussed risks and benefits. Patient hooked to sensor belt monitor that was paired to capsule. Patient swallowed capsule without difficulty at 0815. Patient returned to office at 1630 to return equipment. Patient did not have any complaints of abdominal pain, nausea or vomiting. Pictures downloaded for review by Dr. Howell.

## 2023-05-03 ENCOUNTER — TELEPHONE (OUTPATIENT)
Dept: GASTROENTEROLOGY | Facility: CLINIC | Age: 38
End: 2023-05-03
Payer: MEDICAID

## 2023-05-03 NOTE — TELEPHONE ENCOUNTER
Called patient and left voicemail notifying her. Gave her number to call back if she would like the xray ordered or any further questions

## 2023-05-03 NOTE — TELEPHONE ENCOUNTER
Reviewed images on pillcam, it passed into the colon from the small bowel. She may not see the camera pass. It can take several days to pass from colon, all dependent on her bowel habits.     If she would like a KUB, I can order but will not likely be helpful. Just make sure she is having BMs. Call back with any worsening symptoms.

## 2023-05-03 NOTE — TELEPHONE ENCOUNTER
----- Message from Gabrielle Berman MA sent at 5/3/2023  3:32 PM EDT -----  Patient called today. She had pill cam endoscopy done on 4/27/23 which has not yet been read. She states that she hasn't seen the capsule pass yet and she is getting concerned. She has been having some nausea/abdominal pain and dry heaving but also has a suspected cyst so she is unsure if it is just related to that or the capsule. She wants to see if we can order the xray kub to verify passage. Please advise.

## 2023-05-08 NOTE — PROGRESS NOTES
Subjective   Emily Tracey is a 37 y.o. female.   Chief Complaint   Patient presents with   • Anal Fissure   • Follow-up       History of Present Illness   Ms. Andujar returns the office for 1 month follow-up related to previous diagnosis of an acute anal fissure.  She was given topical lidocaine and topical nifedipine for management, but states that she has only recently begun using this therapy, and tells me that she has not been using it as she should have been.  She also tells me that she recently completed a PillCam, and was informed via telephone yesterday that she had findings consistent with Crohn's disease.  She is scheduled for GI follow-up later today.    The following portions of the patient's history were reviewed and updated as appropriate: allergies, current medications, past family history, past medical history, past social history, past surgical history and problem list.    Review of Systems   Constitutional: Negative for chills, fever and unexpected weight change.   HENT: Negative for hearing loss, trouble swallowing and voice change.    Eyes: Negative for visual disturbance.   Respiratory: Negative for apnea, cough, chest tightness, shortness of breath and wheezing.    Cardiovascular: Negative for chest pain, palpitations and leg swelling.   Gastrointestinal: Positive for abdominal pain, constipation, diarrhea and rectal pain. Negative for abdominal distention, anal bleeding, blood in stool, nausea and vomiting.   Endocrine: Negative for cold intolerance and heat intolerance.   Genitourinary: Negative for difficulty urinating, dysuria and flank pain.   Musculoskeletal: Negative for back pain and gait problem.   Skin: Negative for color change, rash and wound.   Neurological: Negative for dizziness, syncope, speech difficulty, weakness, light-headedness, numbness and headaches.   Hematological: Negative for adenopathy. Does not bruise/bleed easily.   Psychiatric/Behavioral: Negative for  "confusion. The patient is not nervous/anxious.        Objective    /88   Pulse 88   Temp 97.3 °F (36.3 °C)   Ht 165.1 cm (65\")   Wt 115 kg (253 lb)   LMP 05/05/2023 (Approximate)   SpO2 96%   BMI 42.10 kg/m²     Physical Exam  Constitutional:       Appearance: She is well-developed. She is obese.   HENT:      Head: Normocephalic and atraumatic.   Cardiovascular:      Rate and Rhythm: Regular rhythm.   Pulmonary:      Effort: Pulmonary effort is normal.   Skin:     General: Skin is warm and dry.   Neurological:      Mental Status: She is alert and oriented to person, place, and time.   Psychiatric:         Behavior: Behavior normal.         Assessment & Plan   Diagnoses and all orders for this visit:    1. Anal fissure (Primary)    Given that the patient has only recently begun therapy for the anal fissure, it is not surprising that she has had no improvement in her symptoms.  I again discussed with her the pathophysiology of anal fissures.  We discussed their management and the expectations regarding improvement in her symptomatology.  We discussed the other options for management, including referral to colorectal surgery.  It is my feeling that she has not yet had an adequate trial of medical therapy.  She is agreeable to an ongoing trial of medical therapy as previously prescribed with repeat follow-up in this office in 2 months.    "

## 2023-05-10 ENCOUNTER — OFFICE VISIT (OUTPATIENT)
Dept: OBSTETRICS AND GYNECOLOGY | Facility: CLINIC | Age: 38
End: 2023-05-10
Payer: MEDICAID

## 2023-05-10 ENCOUNTER — LAB (OUTPATIENT)
Dept: LAB | Facility: HOSPITAL | Age: 38
End: 2023-05-10
Payer: MEDICAID

## 2023-05-10 VITALS
HEIGHT: 66 IN | SYSTOLIC BLOOD PRESSURE: 128 MMHG | DIASTOLIC BLOOD PRESSURE: 80 MMHG | WEIGHT: 252 LBS | BODY MASS INDEX: 40.5 KG/M2

## 2023-05-10 DIAGNOSIS — N95.1 MENOPAUSAL SYMPTOMS: ICD-10-CM

## 2023-05-10 DIAGNOSIS — K50.00 CROHN'S DISEASE OF SMALL INTESTINE WITHOUT COMPLICATION: ICD-10-CM

## 2023-05-10 DIAGNOSIS — R10.2 PELVIC PAIN: Primary | ICD-10-CM

## 2023-05-10 DIAGNOSIS — N83.201 CYST OF RIGHT OVARY: ICD-10-CM

## 2023-05-10 DIAGNOSIS — Z87.42 PERSONAL HISTORY OF ENDOMETRIOSIS: ICD-10-CM

## 2023-05-10 DIAGNOSIS — N92.6 IRREGULAR MENSES: ICD-10-CM

## 2023-05-10 LAB
ESTRADIOL SERPL HS-MCNC: 54.9 PG/ML
FSH SERPL-ACNC: 12 MIU/ML
T3FREE SERPL-MCNC: 3.49 PG/ML (ref 2–4.4)
T4 FREE SERPL-MCNC: 1.33 NG/DL (ref 0.93–1.7)
TSH SERPL DL<=0.05 MIU/L-ACNC: 0.59 UIU/ML (ref 0.27–4.2)

## 2023-05-10 PROCEDURE — 86480 TB TEST CELL IMMUN MEASURE: CPT

## 2023-05-10 PROCEDURE — 81335 TPMT GENE COM VARIANTS: CPT | Performed by: PHYSICIAN ASSISTANT

## 2023-05-10 PROCEDURE — 82746 ASSAY OF FOLIC ACID SERUM: CPT | Performed by: PHYSICIAN ASSISTANT

## 2023-05-10 PROCEDURE — 83001 ASSAY OF GONADOTROPIN (FSH): CPT | Performed by: PHYSICIAN ASSISTANT

## 2023-05-10 PROCEDURE — 86803 HEPATITIS C AB TEST: CPT | Performed by: PHYSICIAN ASSISTANT

## 2023-05-10 PROCEDURE — 84443 ASSAY THYROID STIM HORMONE: CPT | Performed by: PHYSICIAN ASSISTANT

## 2023-05-10 PROCEDURE — 86140 C-REACTIVE PROTEIN: CPT | Performed by: PHYSICIAN ASSISTANT

## 2023-05-10 PROCEDURE — 36415 COLL VENOUS BLD VENIPUNCTURE: CPT | Performed by: OBSTETRICS & GYNECOLOGY

## 2023-05-10 PROCEDURE — 84481 FREE ASSAY (FT-3): CPT | Performed by: PHYSICIAN ASSISTANT

## 2023-05-10 PROCEDURE — 82652 VIT D 1 25-DIHYDROXY: CPT | Performed by: PHYSICIAN ASSISTANT

## 2023-05-10 PROCEDURE — 83540 ASSAY OF IRON: CPT | Performed by: PHYSICIAN ASSISTANT

## 2023-05-10 PROCEDURE — 84439 ASSAY OF FREE THYROXINE: CPT | Performed by: PHYSICIAN ASSISTANT

## 2023-05-10 PROCEDURE — 80053 COMPREHEN METABOLIC PANEL: CPT | Performed by: PHYSICIAN ASSISTANT

## 2023-05-10 PROCEDURE — 87340 HEPATITIS B SURFACE AG IA: CPT | Performed by: PHYSICIAN ASSISTANT

## 2023-05-10 PROCEDURE — 99214 OFFICE O/P EST MOD 30 MIN: CPT | Performed by: OBSTETRICS & GYNECOLOGY

## 2023-05-10 PROCEDURE — 85025 COMPLETE CBC W/AUTO DIFF WBC: CPT | Performed by: PHYSICIAN ASSISTANT

## 2023-05-10 PROCEDURE — 82607 VITAMIN B-12: CPT | Performed by: PHYSICIAN ASSISTANT

## 2023-05-10 PROCEDURE — 84466 ASSAY OF TRANSFERRIN: CPT | Performed by: PHYSICIAN ASSISTANT

## 2023-05-10 PROCEDURE — 82670 ASSAY OF TOTAL ESTRADIOL: CPT | Performed by: PHYSICIAN ASSISTANT

## 2023-05-10 PROCEDURE — 84403 ASSAY OF TOTAL TESTOSTERONE: CPT | Performed by: OBSTETRICS & GYNECOLOGY

## 2023-05-10 PROCEDURE — 84402 ASSAY OF FREE TESTOSTERONE: CPT | Performed by: OBSTETRICS & GYNECOLOGY

## 2023-05-10 RX ORDER — MONTELUKAST SODIUM 10 MG/1
10 TABLET ORAL NIGHTLY
COMMUNITY
Start: 2023-04-26

## 2023-05-11 ENCOUNTER — OFFICE VISIT (OUTPATIENT)
Dept: GASTROENTEROLOGY | Facility: CLINIC | Age: 38
End: 2023-05-11
Payer: MEDICAID

## 2023-05-11 ENCOUNTER — OFFICE VISIT (OUTPATIENT)
Dept: SURGERY | Facility: CLINIC | Age: 38
End: 2023-05-11
Payer: MEDICAID

## 2023-05-11 VITALS
BODY MASS INDEX: 42.27 KG/M2 | WEIGHT: 254 LBS | OXYGEN SATURATION: 97 % | HEART RATE: 78 BPM | SYSTOLIC BLOOD PRESSURE: 104 MMHG | DIASTOLIC BLOOD PRESSURE: 78 MMHG

## 2023-05-11 VITALS
HEIGHT: 65 IN | OXYGEN SATURATION: 96 % | SYSTOLIC BLOOD PRESSURE: 116 MMHG | DIASTOLIC BLOOD PRESSURE: 88 MMHG | HEART RATE: 88 BPM | WEIGHT: 253 LBS | TEMPERATURE: 97.3 F | BODY MASS INDEX: 42.15 KG/M2

## 2023-05-11 DIAGNOSIS — K86.81 EXOCRINE PANCREATIC INSUFFICIENCY: ICD-10-CM

## 2023-05-11 DIAGNOSIS — K50.00 CROHN'S DISEASE OF SMALL INTESTINE WITHOUT COMPLICATION: Primary | ICD-10-CM

## 2023-05-11 DIAGNOSIS — K60.2 ANAL FISSURE: Primary | ICD-10-CM

## 2023-05-11 DIAGNOSIS — E53.8 FOLIC ACID DEFICIENCY: ICD-10-CM

## 2023-05-11 DIAGNOSIS — R19.7 DIARRHEA, UNSPECIFIED TYPE: ICD-10-CM

## 2023-05-11 PROCEDURE — 1159F MED LIST DOCD IN RCRD: CPT | Performed by: PHYSICIAN ASSISTANT

## 2023-05-11 PROCEDURE — 99214 OFFICE O/P EST MOD 30 MIN: CPT | Performed by: PHYSICIAN ASSISTANT

## 2023-05-11 PROCEDURE — 1160F RVW MEDS BY RX/DR IN RCRD: CPT | Performed by: PHYSICIAN ASSISTANT

## 2023-05-11 RX ORDER — LANOLIN ALCOHOL/MO/W.PET/CERES
400 CREAM (GRAM) TOPICAL DAILY
Qty: 90 TABLET | Refills: 1 | Status: SHIPPED | OUTPATIENT
Start: 2023-05-11

## 2023-05-11 NOTE — PROGRESS NOTES
"     Follow Up Note     Date: 2023   Patient Name: Emily Tracey  MRN: [id number]  : 1985     Primary Care Provider: Provider, No Known     Chief Complaint   Patient presents with   • Results     Pill Cam Endoscopy and labs     History of present illness:   2023  Emily Tracey is a 37 y.o. female who is here today for follow up regarding Results (Pill Cam Endoscopy and labs).    She had pillcam since last visit and would like to discuss those results. She was notified of abnormal results on the pillcam and had labs completed as ordered yesterday. She is glad to have answers for her ongoing GI complaints. She is having daily BMs, has some increased abdominal discomfort on the LUQ and frequent loose stools today. She still has intermittent dark stools. Has continued the Creon because it has helped some with the diarrhea.     Interval History:  2022  She feels that she could have Crohn's disease. Lost 100 lbs over the past 5 years. She has small \"ribbon like\" stools. Has mucous in stools. Nausea is present daily. Has intermittent vomiting. Has had black stools intermittently as well as bright red rectal bleeding at times. Has LUQ abdominal pain which is worse after eating. She was seen in the ED at [facility] due to passing large blood vaginal (thought was decidual cast). Pain in the LUQ radiates downward into the LLQ. She has noticed worse pain that around the time of her menstrual cycle, she will have diarrhea after a period of feeling very constipated. Her bowel habits have fluctuated in consistency for years, at least the past 6 years.      Has noticed an occasional sensation of dysphagia. Has sensation that there is a lump and points to the right side of her neck. Had thyroid ultrasound which was normal recently. Has heartburn 3-4 times per week. She takes famotidine 20 mg 3 times weekly PRN heartburn. Previously took zantac for years.      No prior history of liver disease. Had labs " recently by PCP and vitamin B12 was low. Maternal uncle with esophageal cancer. There is no known family history of colon cancer or colon polyps. No family history of IBD.    Subjective     Past Medical History:   Diagnosis Date   • Abdominal pain    • Anxiety    • Asthma    • Backache    • Blood in stool    • Colon polyp    • COVID-19 vaccine series completed     with booster   • Crohn disease    • Depression 2001   • Diverticulitis of colon    • Edema    • Endometriosis    • GERD (gastroesophageal reflux disease)    • Kidney infection    • Low back pain    • LUQ pain    • Migraine headache    • Ovarian cyst    • Pain and swelling of right lower leg    • Panic attacks    • Sacroiliac joint pain    • Suspicious nevus    • Tattoo    • Urgency of urination    • Vaginitis    • Wears glasses      Past Surgical History:   Procedure Laterality Date   • COLONOSCOPY     • COLONOSCOPY N/A 08/16/2022    Procedure: COLONOSCOPY CPT CODE: 13953;  Surgeon: Johnna Howell MD;  Location: [facility] ENDOSCOPY;  Service: Gastroenterology;  Laterality: N/A;   • ENDOSCOPY N/A 06/14/2022    Procedure: ESOPHAGOGASTRODUODENOSCOPY WITH BIOPSY;  Surgeon: Johnna Howell MD;  Location: [facility] ENDOSCOPY;  Service: Gastroenterology;  Laterality: N/A;   • OTHER SURGICAL HISTORY  2001    Laparoscopic excision uterine surface endometriotic tissue x2   • UPPER GASTROINTESTINAL ENDOSCOPY     • WISDOM TOOTH EXTRACTION       Family History   Problem Relation Age of Onset   • Cancer Mother    • COPD Mother    • Heart attack Father    • Esophageal cancer Maternal Uncle    • Heart attack Other         Grandparent   • Arthritis Other         Grandparent   • Cancer Other         Grandparent   • Diabetes Other         Grandparent   • Migraines Other         Grandparent   • Osteoporosis Other    • Colon cancer Neg Hx      Social History     Socioeconomic History   • Marital status:    Tobacco Use   • Smoking status: Former     Types:  Cigarettes     Quit date: 1/1/2013     Years since quitting: 10.3   • Smokeless tobacco: Never   Vaping Use   • Vaping Use: Never used   Substance and Sexual Activity   • Alcohol use: Yes     Alcohol/week: 1.0 standard drink     Types: 1 Glasses of wine per week     Comment: occas.   • Drug use: No   • Sexual activity: Defer       Current Outpatient Medications:   •  albuterol (PROVENTIL HFA;VENTOLIN HFA) 108 (90 BASE) MCG/ACT inhaler, 2 puffs 4 (Four) Times a Day As Needed., Disp: , Rfl:   •  cloNIDine (CATAPRES) 0.1 MG tablet, , Disp: , Rfl:   •  cyclobenzaprine (FLEXERIL) 5 MG tablet, Take 1 tablet by mouth Daily As Needed., Disp: , Rfl:   •  Emollient (Ointment Base) ointment, Nifedipine ointment 0.3%. Apply small amount to rectum 2-3 times per day, Disp: 30 g, Rfl: 0  •  famotidine (PEPCID) 40 MG tablet, TAKE 1 TABLET BY MOUTH DAILY AS NEEDED FOR HEARTBURN (CAN TAKE UP TO 2 TIMES DAILY AS NEEDED), Disp: 180 tablet, Rfl: 1  •  fluticasone-salmeterol (ADVAIR) 500-50 MCG/DOSE DISKUS, 1 puff 2 (Two) Times a Day., Disp: , Rfl:   •  ipratropium-albuterol (DUO-NEB) 0.5-2.5 mg/3 ml nebulizer, INHALE ONE VIAL (3ML) VIA NEBULIZER FOUR TIMES DAILY, Disp: , Rfl:   •  lidocaine (XYLOCAINE) 5 % ointment, Apply 1 application topically to the appropriate area as directed Every 2 (Two) Hours As Needed for Mild Pain., Disp: 50 g, Rfl: 0  •  Lidocaine-Hydrocort, Perianal, 3-0.5 % cream rectal cream, Insert 1 application into the rectum 2 (Two) Times a Day As Needed (rectal pain)., Disp: 85 g, Rfl: 3  •  ondansetron ODT (ZOFRAN-ODT) 4 MG disintegrating tablet, PLACE 1 TABLET ON THE TONGUE EVERY 8 (EIGHT) HOURS AS NEEDED FOR NAUSEA OR VOMITING., Disp: 42 tablet, Rfl: 3  •  Pancrelipase, Lip-Prot-Amyl, (Creon) 04298-142998 units capsule delayed-release particles capsule, Take 2 caps with meals and 1 with snacks., Disp: 240 capsule, Rfl: 3  •  pantoprazole (PROTONIX) 20 MG EC tablet, Take 1 tablet by mouth Daily., Disp: 90 tablet,  Rfl: 3  •  vitamin D (ERGOCALCIFEROL) 1.25 MG (51741 UT) capsule capsule, TAKE 1 CAPSULE BY MOUTH ONE TIME PER WEEK FOR 30 DAYS, Disp: , Rfl:   •  montelukast (SINGULAIR) 10 MG tablet, Take 1 tablet by mouth Every Night. (Patient not taking: Reported on 5/11/2023), Disp: , Rfl:     Allergies   Allergen Reactions   • Hydroxyzine Hives   • Lamotrigine Rash     The following portions of the patient's history were reviewed and updated as appropriate: allergies, current medications, past family history, past medical history, past social history, past surgical history and problem list.    Objective     Physical Exam  Constitutional:       General: She is not in acute distress.     Appearance: Normal appearance. She is well-developed. She is not diaphoretic.   HENT:      Head: Normocephalic and atraumatic.      Right Ear: External ear normal.      Left Ear: External ear normal.      Nose: Nose normal.   Eyes:      General: No scleral icterus.        Right eye: No discharge.         Left eye: No discharge.      Conjunctiva/sclera: Conjunctivae normal.   Neck:      Trachea: No tracheal deviation.   Pulmonary:      Effort: Pulmonary effort is normal. No respiratory distress.   Musculoskeletal:         General: Normal range of motion.      Cervical back: Normal range of motion.   Skin:     Coloration: Skin is not pale.      Findings: No erythema or rash.   Neurological:      Mental Status: She is alert and oriented to person, place, and time.      Coordination: Coordination normal.   Psychiatric:         Mood and Affect: Mood normal.         Behavior: Behavior normal.         Thought Content: Thought content normal.         Judgment: Judgment normal.       Vitals:    05/11/23 1324   BP: 104/78   Pulse: 78   SpO2: 97%   Weight: 115 kg (254 lb)     Results Review:   I reviewed the patient's new clinical results.    Office Visit on 05/10/2023   Component Date Value Ref Range Status   • FSH 05/10/2023 12.00  mIU/mL Final   •  Estradiol 05/10/2023 54.9  pg/mL Final   • TSH 05/10/2023 0.588  0.270 - 4.200 uIU/mL Final   • Free T4 05/10/2023 1.33  0.93 - 1.70 ng/dL Final   • T3, Free 05/10/2023 3.49  2.00 - 4.40 pg/mL Final   Results Encounter on 05/09/2023   Component Date Value Ref Range Status   • C-Reactive Protein 05/10/2023 0.51 (H)  0.00 - 0.50 mg/dL Final   • Hepatitis B Surface Ag 05/10/2023 Non-Reactive  Non-Reactive Final   • Hepatitis C Ab 05/10/2023 Non-Reactive  Non-Reactive Final   • Vitamin B-12 05/10/2023 256  211 - 946 pg/mL Final   • Folate 05/10/2023 4.45 (L)  4.78 - 24.20 ng/mL Final   • Iron 05/10/2023 84  37 - 145 mcg/dL Final   • Iron Saturation 05/10/2023 25  20 - 50 % Final   • Transferrin 05/10/2023 229  200 - 360 mg/dL Final   • TIBC 05/10/2023 341  298 - 536 mcg/dL Final   • WBC 05/10/2023 10.82 (H)  3.40 - 10.80 10*3/mm3 Final   • RBC 05/10/2023 4.07  3.77 - 5.28 10*6/mm3 Final   • Hemoglobin 05/10/2023 13.9  12.0 - 15.9 g/dL Final   • Hematocrit 05/10/2023 40.4  34.0 - 46.6 % Final   • MCV 05/10/2023 99.3 (H)  79.0 - 97.0 fL Final   • MCH 05/10/2023 34.2 (H)  26.6 - 33.0 pg Final   • MCHC 05/10/2023 34.4  31.5 - 35.7 g/dL Final   • RDW 05/10/2023 14.3  12.3 - 15.4 % Final   • RDW-SD 05/10/2023 51.5  37.0 - 54.0 fl Final   • MPV 05/10/2023 11.6  6.0 - 12.0 fL Final   • Platelets 05/10/2023 222  140 - 450 10*3/mm3 Final   • nRBC 05/10/2023 0.0  0.0 - 0.2 /100 WBC Final   • Glucose 05/10/2023 97  65 - 99 mg/dL Final   • BUN 05/10/2023 6  6 - 20 mg/dL Final   • Creatinine 05/10/2023 0.76  0.57 - 1.00 mg/dL Final   • Sodium 05/10/2023 141  136 - 145 mmol/L Final   • Potassium 05/10/2023 3.8  3.5 - 5.2 mmol/L Final   • Chloride 05/10/2023 105  98 - 107 mmol/L Final   • CO2 05/10/2023 25.1  22.0 - 29.0 mmol/L Final   • Calcium 05/10/2023 9.0  8.6 - 10.5 mg/dL Final   • Total Protein 05/10/2023 6.1  6.0 - 8.5 g/dL Final   • Albumin 05/10/2023 4.0  3.5 - 5.2 g/dL Final   • ALT (SGPT) 05/10/2023 7  1 - 33 U/L Final   •  AST (SGOT) 05/10/2023 17  1 - 32 U/L Final   • Alkaline Phosphatase 05/10/2023 61  39 - 117 U/L Final   • Total Bilirubin 05/10/2023 0.7  0.0 - 1.2 mg/dL Final   • Globulin 05/10/2023 2.1  gm/dL Final   • A/G Ratio 05/10/2023 1.9  g/dL Final   • BUN/Creatinine Ratio 05/10/2023 7.9  7.0 - 25.0 Final   • Anion Gap 05/10/2023 10.9  5.0 - 15.0 mmol/L Final   • eGFR 05/10/2023 102.4  >60.0 mL/min/1.73 Final      EGD 6/14/2022 by Dr. Howell  - Normal oropharynx.  - Z-line irregular, 39 cm from the incisors.  - 2 cm hiatal hernia.  - Esophageal mucosal changes suspicious for long-segment Chatman's esophagus, classified as Chatman's stage C3-M4 per Leakey criteria. Biopsied.  - Erythematous mucosa in the posterior wall of the stomach and antrum. Biopsied.  - Normal duodenal bulb, first portion of the duodenum, second portion of the duodenum and third portion of the duodenum. Biopsied.  - No endoscopic esophageal abnormality to explain patient's dysphagia. Biopsied.  Pathology DIAGNOSIS:   A.   DUODENUM, BIOPSY:   Small bowel mucosa with no significant pathologic abnormality   B.   ANTRUM AND BODY, BIOPSY:   Antral type gastric mucosa with reactive gastropathy   Body type gastric mucosa with no significant pathologic abnormality   No Helicobacter pylori like organisms identified on H&E stained slide   No intestinal metaplasia or dysplasia identified   C.   ESOPHAGUS, BIOPSY, LOWER:   Squamocolumnar junctional mucosa with intestinal metaplasia; findings   compatible with Chatman's esophagus   No evidence of dysplasia identified   D.   ESOPHAGUS, BIOPSY, DISTAL, MID AND PROXIMAL:   Squamous mucosa with mild reactive/reflux related changes   No increased intraepithelial eosinophils, intestinal metaplasia or   dysplasia identified     Colonoscopy 8/16/2022 by Dr. Howell  - Preparation of the colon was fair.  - Perianal skin tags found on perianal exam.  - One 3 to 4 mm polyp in the cecum, removed with a cold snare. Resected  "and retrieved.  - One 6 to 8 mm polyp in the proximal ascending colon, removed with a cold snare. Resected and retrieved.  - Non-bleeding internal/ external hemorrhoids.  - The examined portion of the ileum was normal. Biopsied.  - Biopsies were taken with a cold forceps from the right colon, left colon and transverse colon for evaluation of microscopic colitis.   Pathology DIAGNOSIS:   A.   TERMINAL ILEUM BIOPSY:   Ileal mucosa with no significant histopathologic abnormality   Negative for dysplasia or carcinoma  B.   COLON, BIOPSIES, LEFT, RIGHT, AND TRANSVERSE:   Colonic mucosa with no significant histopathologic abnormality   No evidence of dysplasia, carcinoma, or microscopic colitis   C.   CECUM POLYP:   Tubular adenoma   Negative for carcinoma or high-grade dysplasia   D.   ASCENDING COLON BIOPSY:   Fragments of tubular adenoma   Negative for carcinoma or high-grade dysplasia     pillcam 4/27/2023  Few healed inflammation scars noted in the jejunum and proximal ileum.  At least three active aphthae identified in the TI and few healing ulcerations and inflammatory scars in the distal ileum and TI noted.    Assessment / Plan      1. Crohn's disease of small intestine without complication  2. Folic acid deficiency  3. Diarrhea, unspecified type  4. Exocrine pancreatic insufficiency  Since starting Creon when stool test abnormal for EPI, all symptoms have improved. She still has diarrhea, bloating, belching, fecal urgency and abdominal pain but all improved now. Her bowel habits have fluctuated in consistency for years, at least the past >6 years but prior to that stools were occurring daily. Still has small \"ribbon like\" stools, mucous in stools and intermittent very dark stools. Lost 100 lbs over the past 5-6 years. Had labs by PCP and vitamin D and B12 were low, now with recent low folate. CTAP 2/2023 with contrast with mild stool burden, no other GI pathology. She took Xifaxan 550 mg TID x14 days for IBS-D " without changes.     EGD 6/2022 showed EGD 6/2022 showed Chatman's esophagus, 2 cm hiatal hernia, patchy mild erythema in the stomach, normal duodenum. Pathology benign other than Chatman's. No celiac disease. Colonoscopy 8/2022 showed no endoscopic signs of colitis or ileitis. TI biopsy and random colon biopsies normal. Previous EGD 2015 showed food residue in the stomach and findings concerning for Chatman's (path negative). Colonoscopy 2015 showed focal areas of erythema in the terminal ileum (path normal), normal random colon biopsies and proctitis (path described ischemia or NSAID effect). Pillcam 4/2023 showed ulcers in the TI, healed inflammation in the proximal ileum and jejunum.     Since she had continued complaints with abnormal stools, weight loss and dark stools intermittently, she still felt she could have an underlying IBD. PillCam consistent with mild to moderate Crohn's disease of the small intestine which is a new diagnosis. CRP elevated at 0.51. Fecal calprotectin previously normal.      Continue Zofran PRN nausea  Continue low dose PPI daily  Low fiber diet when having more symptoms and continue to avoid salima  Small portions at meals  Continue Creon as directed  Start Budesonide 9 mg 8 weeks followed by 4 weeks 6 mg, then 3 mg for 4 weeks   Start folic acid supplementation  Next step in management will be dependent on improvements  Reassess after that if sympotms improve, consider repeat Pillcam in 1-2 years off medicine   If symptoms persist, will treat her with imuran/6MP monotherapy or biologics      - folic acid (FOLVITE) 400 MCG tablet; Take 1 tablet by mouth Daily.  Dispense: 90 tablet; Refill: 1       Prior history:  Family history of esophageal cancer  Chatman's esophagus without dysplasia  Maternal uncle with esophageal cancer. EGD 6/2022 showed long segment (4 cm in length) Chatman's esophagus, 2 cm hiatal hernia, patchy mild erythema in the stomach, normal duodenum, no endoscopic  esophageal abnormality to explain patient's dysphagia. Repeat EGD 2 years, due 6/2024.    Follow Up:   Return in about 2 months (around 7/11/2023) for recheck Crohn's.    Darcie Cabrales PA-C  [facility] [place]  5/11/2023  15:42 EDT    Dictated Utilizing Dragon Dictation: Part of this note may be an electronic transcription/translation of spoken language to printed text using the Dragon Dictation System.

## 2023-05-12 LAB
GAMMA INTERFERON BACKGROUND BLD IA-ACNC: 0 IU/ML
M TB IFN-G BLD-IMP: NEGATIVE
M TB IFN-G CD4+ T-CELLS BLD-ACNC: 0 IU/ML
M TBIFN-G CD4+ CD8+T-CELLS BLD-ACNC: 0 IU/ML
MITOGEN IGNF BLD-ACNC: >10 IU/ML
QUANTIFERON INCUBATION: NORMAL
SERVICE CMNT-IMP: NORMAL
TESTOST FREE SERPL-MCNC: 0.4 PG/ML (ref 0–4.2)
TESTOST SERPL-MCNC: 24 NG/DL (ref 8–60)

## 2023-05-12 NOTE — PROGRESS NOTES
Chief Complaint  Follow-up (Transvaginal ultrasound- Left ovarian cyst seen on CT scan, patient complains of irregular menstrual cycles. )     History of Present Illness:  Patient is 37 y.o.  who presents to [facility] here for follow-up evaluation of a left ovarian cyst seen on CT scan earlier this year.  Patient had been having pelvic pain.  She does report she was recently diagnosed with Crohn's disease.  She is due to have additional labs today.  She will be starting Entocort for her Crohn's disease.  She is seeing the gastroenterologist.  Patient also reports she has been having irregular menstrual cycles.  She has skipped 2.  She has been having hot flashes as well as night sweats.  She has not had any recent hormonal assessment.  He does have known endometriosis as well.    History  Past Medical History:   Diagnosis Date   • Abdominal pain    • Anxiety    • Asthma    • Backache    • Blood in stool    • Colon polyp    • COVID-19 vaccine series completed     with booster   • Crohn disease    • Depression    • Diverticulitis of colon    • Edema    • Endometriosis    • GERD (gastroesophageal reflux disease)    • Kidney infection    • Low back pain    • LUQ pain    • Migraine headache    • Ovarian cyst    • Pain and swelling of right lower leg    • Panic attacks    • Sacroiliac joint pain    • Suspicious nevus    • Tattoo    • Urgency of urination    • Vaginitis    • Wears glasses      Current Outpatient Medications on File Prior to Visit   Medication Sig Dispense Refill   • albuterol (PROVENTIL HFA;VENTOLIN HFA) 108 (90 BASE) MCG/ACT inhaler 2 puffs 4 (Four) Times a Day As Needed.     • Budesonide (ENTOCORT EC) 3 MG 24 hr capsule Take 3 capsules by mouth Every Morning for 60 days. (Patient not taking: Reported on 2023) 180 capsule 0   • cloNIDine (CATAPRES) 0.1 MG tablet      • cyclobenzaprine (FLEXERIL) 5 MG tablet Take 1 tablet by mouth Daily As Needed.     • Emollient  (Ointment Base) ointment Nifedipine ointment 0.3%. Apply small amount to rectum 2-3 times per day 30 g 0   • famotidine (PEPCID) 40 MG tablet TAKE 1 TABLET BY MOUTH DAILY AS NEEDED FOR HEARTBURN (CAN TAKE UP TO 2 TIMES DAILY AS NEEDED) 180 tablet 1   • fluticasone-salmeterol (ADVAIR) 500-50 MCG/DOSE DISKUS 1 puff 2 (Two) Times a Day.     • ipratropium-albuterol (DUO-NEB) 0.5-2.5 mg/3 ml nebulizer INHALE ONE VIAL (3ML) VIA NEBULIZER FOUR TIMES DAILY     • lidocaine (XYLOCAINE) 5 % ointment Apply 1 application topically to the appropriate area as directed Every 2 (Two) Hours As Needed for Mild Pain. 50 g 0   • Lidocaine-Hydrocort, Perianal, 3-0.5 % cream rectal cream Insert 1 application into the rectum 2 (Two) Times a Day As Needed (rectal pain). 85 g 3   • montelukast (SINGULAIR) 10 MG tablet Take 1 tablet by mouth Every Night. (Patient not taking: Reported on 5/11/2023)     • ondansetron ODT (ZOFRAN-ODT) 4 MG disintegrating tablet PLACE 1 TABLET ON THE TONGUE EVERY 8 (EIGHT) HOURS AS NEEDED FOR NAUSEA OR VOMITING. 42 tablet 3   • Pancrelipase, Lip-Prot-Amyl, (Creon) 87859-937373 units capsule delayed-release particles capsule Take 2 caps with meals and 1 with snacks. 240 capsule 3   • pantoprazole (PROTONIX) 20 MG EC tablet Take 1 tablet by mouth Daily. 90 tablet 3   • vitamin D (ERGOCALCIFEROL) 1.25 MG (04097 UT) capsule capsule TAKE 1 CAPSULE BY MOUTH ONE TIME PER WEEK FOR 30 DAYS       No current facility-administered medications on file prior to visit.     Allergies   Allergen Reactions   • Hydroxyzine Hives   • Lamotrigine Rash     Past Surgical History:   Procedure Laterality Date   • COLONOSCOPY     • COLONOSCOPY N/A 08/16/2022    Procedure: COLONOSCOPY CPT CODE: 23944;  Surgeon: Johnna Howell MD;  Location: [facility] ENDOSCOPY;  Service: Gastroenterology;  Laterality: N/A;   • ENDOSCOPY N/A 06/14/2022    Procedure: ESOPHAGOGASTRODUODENOSCOPY WITH BIOPSY;  Surgeon: Johnna Howell MD;  Location:  "[facility] ENDOSCOPY;  Service: Gastroenterology;  Laterality: N/A;   • OTHER SURGICAL HISTORY  2001    Laparoscopic excision uterine surface endometriotic tissue x2   • UPPER GASTROINTESTINAL ENDOSCOPY     • WISDOM TOOTH EXTRACTION       Family History   Problem Relation Age of Onset   • Cancer Mother    • COPD Mother    • Heart attack Father    • Esophageal cancer Maternal Uncle    • Heart attack Other         Grandparent   • Arthritis Other         Grandparent   • Cancer Other         Grandparent   • Diabetes Other         Grandparent   • Migraines Other         Grandparent   • Osteoporosis Other    • Colon cancer Neg Hx      Social History     Socioeconomic History   • Marital status:    Tobacco Use   • Smoking status: Former     Types: Cigarettes     Quit date: 1/1/2013     Years since quitting: 10.3   • Smokeless tobacco: Never   Vaping Use   • Vaping Use: Never used   Substance and Sexual Activity   • Alcohol use: Yes     Alcohol/week: 1.0 standard drink     Types: 1 Glasses of wine per week     Comment: occas.   • Drug use: No   • Sexual activity: Defer       Physical Examination:  Vital Signs: /80   Ht 167.6 cm (66\")   Wt 114 kg (252 lb)   BMI 40.67 kg/m²     General Appearance: alert, appears stated age, and cooperative  Breasts: Not performed.  Abdomen: no masses, no hepatomegaly, no splenomegaly, soft non-tender, no guarding, and no rebound tenderness  Pelvic: Not performed.    Data Review:  The following data was reviewed by: Gail Jenkins MD on 05/10/2023:     Labs:  Calprotectin, Fecal - Stool, Per Rectum (03/21/2023 19:00)  Clostridioides difficile Toxin, PCR - Stool, Per Rectum (03/21/2023 19:00)  Gastrointestinal Panel, PCR - Stool, Per Rectum (03/21/2023 19:00)  Pancreatic Elastase, Fecal - Stool, Per Rectum (03/21/2023 19:00)    Imaging:  CT Abdomen Pelvis With Contrast (02/02/2023 19:28)  US Non-ob Transvaginal (05/10/2023 11:12)    Medical Records:  UPPER GI ENDOSCOPY (06/14/2022 " 13:16)  COLONOSCOPY (08/16/2022 11:16)  Endoscopy, GI With Capsule (04/18/2023 15:25)      Assessment and Plan   1. Cyst of right ovary  Transvaginal ultrasound obtained today.  The previously seen left ovarian cyst has resolved.  She does now have a simple appearing right ovarian cyst.  Instructions and precautions are given.  She may follow-up with repeat imaging in 6 to 8 weeks if desired.  - US Non-ob Transvaginal    2. Irregular menses  Patient with irregular menses over the last several months.  We will obtain labs today as discussed.  Transvaginal ultrasound was performed as well.  Patient informed regarding those findings.  - US Non-ob Transvaginal  - TSH  - T4, Free  - T3, Free    3. Menopausal symptoms  The various options for the management of menopausal symptoms was discussed.  The medical treatment options discussed include HRT, SSRIs, SSNRIs, clonidine, and gabapentin.  The risks and benefits were discussed including the findings from the WHI study.  The increased risk of breast cancer, CAD, stroke, and VTE events were discussed for combination therapy vs the increased risk of CV events and breast cancer not being seen in the estrogen only group.  The lowest effective dose for the shortest duration of treatment was discussed in regards to HRT.  Other alternatives including otc supplements and lifestyle changes were also discussed.  Local estrogen therapy to relieve atrophic vaginal symptoms was discussed was well as other alternatives.  - Follicle Stimulating Hormone  - Estradiol  - Testosterone, Free, Total  - TSH  - T4, Free  - T3, Free    4. Pelvic pain  Patient with pelvic pain as noted.  She had previous CT scan.  Transvaginal ultrasound is obtained today.  - US Non-ob Transvaginal    5. Personal history of endometriosis  Patient with known history of endometriosis.  Her menstrual cycles are now irregular.  Transvaginal ultrasound was obtained.  Patient informed regarding those findings.  - US  Non-ob Transvaginal    Follow Up/Instructions:  Follow up as noted.  Patient was given instructions and counseling regarding her condition or for health maintenance advice. Please see specific information pulled into the AVS if appropriate.     Note: Speech recognition transcription software may have been used to dictate portions of this document.  An attempt at proofreading has been made though minor errors in transcription may still be present.    This note was electronically signed.  Gail Jenkins M.D.

## 2023-07-26 ENCOUNTER — TELEMEDICINE (OUTPATIENT)
Dept: GASTROENTEROLOGY | Facility: CLINIC | Age: 38
End: 2023-07-26
Payer: MEDICAID

## 2023-07-26 DIAGNOSIS — R19.7 DIARRHEA, UNSPECIFIED TYPE: ICD-10-CM

## 2023-07-26 DIAGNOSIS — E53.8 FOLIC ACID DEFICIENCY: ICD-10-CM

## 2023-07-26 DIAGNOSIS — K50.00 CROHN'S DISEASE OF SMALL INTESTINE WITHOUT COMPLICATION: Primary | ICD-10-CM

## 2023-07-26 DIAGNOSIS — K86.81 EXOCRINE PANCREATIC INSUFFICIENCY: ICD-10-CM

## 2023-07-26 PROCEDURE — 99214 OFFICE O/P EST MOD 30 MIN: CPT | Performed by: PHYSICIAN ASSISTANT

## 2023-07-26 NOTE — PROGRESS NOTES
"     Follow Up Note     Date: 2023   Patient Name: Emily Tracey  MRN: [id number]  : 1985     Primary Care Provider: Provider, No Known     Chief Complaint   Patient presents with    Crohn's Disease     History of present illness:   2023  Emily Tracey is a 38 y.o. female who is here today (on video visit) for follow up regarding Crohn's Disease.    She feels that her stool form has improved, more consistent bowel pattern and less urgent stools. Still on budesonide taper as directed since pillcam was abnormal. Still with some abdominal pain, present in LUQ. No rectal bleeding, still with dark stool intermittently. Not taking folic acid. Still taking Creon as directed and feels that this helps as well.     Interval History:  2022  She feels that she could have Crohn's disease. Lost 100 lbs over the past 5 years. She has small \"ribbon like\" stools. Has mucous in stools. Nausea is present daily. Has intermittent vomiting. Has had black stools intermittently as well as bright red rectal bleeding at times. Has LUQ abdominal pain which is worse after eating. She was seen in the ED at [facility] due to passing large blood vaginal (thought was decidual cast). Pain in the LUQ radiates downward into the LLQ. She has noticed worse pain that around the time of her menstrual cycle, she will have diarrhea after a period of feeling very constipated. Her bowel habits have fluctuated in consistency for years, at least the past 6 years.      Has noticed an occasional sensation of dysphagia. Has sensation that there is a lump and points to the right side of her neck. Had thyroid ultrasound which was normal recently. Has heartburn 3-4 times per week. She takes famotidine 20 mg 3 times weekly PRN heartburn. Previously took zantac for years.      No prior history of liver disease. Had labs recently by PCP and vitamin B12 was low. Maternal uncle with esophageal cancer. There is no known family history of colon " cancer or colon polyps. No family history of IBD.    Subjective     Past Medical History:   Diagnosis Date    Abdominal pain     Anxiety     Asthma     Backache     Blood in stool     Colon polyp     COVID-19 vaccine series completed     with booster    Crohn disease     Depression 2001    Diverticulitis of colon     Edema     Endometriosis     GERD (gastroesophageal reflux disease)     Kidney infection     Low back pain     LUQ pain     Migraine headache     Ovarian cyst     Pain and swelling of right lower leg     Panic attacks     Sacroiliac joint pain     Suspicious nevus     Tattoo     Urgency of urination     Vaginitis     Wears glasses      Past Surgical History:   Procedure Laterality Date    COLONOSCOPY      COLONOSCOPY N/A 08/16/2022    Procedure: COLONOSCOPY CPT CODE: 58360;  Surgeon: Johnna Howell MD;  Location: [facility] ENDOSCOPY;  Service: Gastroenterology;  Laterality: N/A;    ENDOSCOPY N/A 06/14/2022    Procedure: ESOPHAGOGASTRODUODENOSCOPY WITH BIOPSY;  Surgeon: Johnna Howell MD;  Location: [facility] ENDOSCOPY;  Service: Gastroenterology;  Laterality: N/A;    OTHER SURGICAL HISTORY  2001    Laparoscopic excision uterine surface endometriotic tissue x2    UPPER GASTROINTESTINAL ENDOSCOPY      WISDOM TOOTH EXTRACTION       Family History   Problem Relation Age of Onset    Cancer Mother     COPD Mother     Heart attack Father     Esophageal cancer Maternal Uncle     Heart attack Other         Grandparent    Arthritis Other         Grandparent    Cancer Other         Grandparent    Diabetes Other         Grandparent    Migraines Other         Grandparent    Osteoporosis Other     Colon cancer Neg Hx      Social History     Socioeconomic History    Marital status:    Tobacco Use    Smoking status: Former     Types: Cigarettes     Quit date: 1/1/2013     Years since quitting: 10.5    Smokeless tobacco: Never   Vaping Use    Vaping Use: Never used   Substance and Sexual Activity     Alcohol use: Yes     Alcohol/week: 1.0 standard drink     Types: 1 Glasses of wine per week     Comment: occas.    Drug use: No    Sexual activity: Defer     Current Outpatient Medications:     albuterol (PROVENTIL HFA;VENTOLIN HFA) 108 (90 BASE) MCG/ACT inhaler, 2 puffs 4 (Four) Times a Day As Needed., Disp: , Rfl:     cyclobenzaprine (FLEXERIL) 10 MG tablet, Take 1 tablet by mouth 3 (Three) Times a Day As Needed for Muscle Spasms., Disp: 10 tablet, Rfl: 0    famotidine (PEPCID) 40 MG tablet, TAKE 1 TABLET BY MOUTH DAILY AS NEEDED FOR HEARTBURN (CAN TAKE UP TO 2 TIMES DAILY AS NEEDED), Disp: 180 tablet, Rfl: 1    fluticasone-salmeterol (ADVAIR) 500-50 MCG/DOSE DISKUS, 1 puff 2 (Two) Times a Day., Disp: , Rfl:     ipratropium-albuterol (DUO-NEB) 0.5-2.5 mg/3 ml nebulizer, INHALE ONE VIAL (3ML) VIA NEBULIZER FOUR TIMES DAILY, Disp: , Rfl:     ondansetron ODT (ZOFRAN-ODT) 4 MG disintegrating tablet, PLACE 1 TABLET ON THE TONGUE EVERY 8 (EIGHT) HOURS AS NEEDED FOR NAUSEA OR VOMITING., Disp: 42 tablet, Rfl: 3    Pancrelipase, Lip-Prot-Amyl, (Creon) 20487-980555 units capsule delayed-release particles capsule, Take 2 caps with meals and 1 with snacks., Disp: 240 capsule, Rfl: 3    pantoprazole (PROTONIX) 20 MG EC tablet, Take 1 tablet by mouth Daily., Disp: 90 tablet, Rfl: 3    vitamin D (ERGOCALCIFEROL) 1.25 MG (89948 UT) capsule capsule, TAKE 1 CAPSULE BY MOUTH ONE TIME PER WEEK FOR 30 DAYS, Disp: , Rfl:     Allergies   Allergen Reactions    Hydroxyzine Hives    Lamotrigine Rash     The following portions of the patient's history were reviewed and updated as appropriate: allergies, current medications, past family history, past medical history, past social history, past surgical history and problem list.    Objective     Physical Exam  Constitutional:       General: She is not in acute distress.     Appearance: Normal appearance. She is well-developed. She is not ill-appearing.   HENT:      Head: Normocephalic and  atraumatic.      Nose: Nose normal.   Eyes:      General: No scleral icterus.  Pulmonary:      Effort: Pulmonary effort is normal. No respiratory distress.   Neurological:      Mental Status: She is alert and oriented to person, place, and time.   Psychiatric:         Mood and Affect: Mood normal.         Behavior: Behavior normal.         Thought Content: Thought content normal.         Judgment: Judgment normal.     There were no vitals filed for this visit.- video visit    Results Review:   I reviewed the patient's new clinical results.    Admission on 07/03/2023, Discharged on 07/03/2023   Component Date Value Ref Range Status    HCG, Urine QL 07/03/2023 Negative  Negative Final   Lab on 05/10/2023   Component Date Value Ref Range Status    QuantiFERON Incubation 05/10/2023 Incubation performed.   Final    QUANTIFERON-TB GOLD PLUS 05/10/2023 Negative  Negative Final   Office Visit on 05/10/2023   Component Date Value Ref Range Status    FSH 05/10/2023 12.00  mIU/mL Final    Estradiol 05/10/2023 54.9  pg/mL Final    Testosterone, Total 05/10/2023 24  8 - 60 ng/dL Final    Testosterone, Free 05/10/2023 0.4  0.0 - 4.2 pg/mL Final    TSH 05/10/2023 0.588  0.270 - 4.200 uIU/mL Final    Free T4 05/10/2023 1.33  0.93 - 1.70 ng/dL Final    T3, Free 05/10/2023 3.49  2.00 - 4.40 pg/mL Final   Results Encounter on 05/09/2023   Component Date Value Ref Range Status    C-Reactive Protein 05/10/2023 0.51 (H)  0.00 - 0.50 mg/dL Final    TPMT Genotype 05/10/2023 *1/*1   Final    Hepatitis B Surface Ag 05/10/2023 Non-Reactive  Non-Reactive Final    Hepatitis C Ab 05/10/2023 Non-Reactive  Non-Reactive Final    1,25-Dihydroxy, Vitamin D 05/10/2023 38.3  24.8 - 81.5 pg/mL Final    Vitamin B-12 05/10/2023 256  211 - 946 pg/mL Final    Folate 05/10/2023 4.45 (L)  4.78 - 24.20 ng/mL Final    Iron 05/10/2023 84  37 - 145 mcg/dL Final    Iron Saturation (TSAT) 05/10/2023 25  20 - 50 % Final    Transferrin 05/10/2023 229  200 - 360 mg/dL  Final    TIBC 05/10/2023 341  298 - 536 mcg/dL Final    WBC 05/10/2023 10.82 (H)  3.40 - 10.80 10*3/mm3 Final    RBC 05/10/2023 4.07  3.77 - 5.28 10*6/mm3 Final    Hemoglobin 05/10/2023 13.9  12.0 - 15.9 g/dL Final    Hematocrit 05/10/2023 40.4  34.0 - 46.6 % Final    MCV 05/10/2023 99.3 (H)  79.0 - 97.0 fL Final    MCH 05/10/2023 34.2 (H)  26.6 - 33.0 pg Final    MCHC 05/10/2023 34.4  31.5 - 35.7 g/dL Final    RDW 05/10/2023 14.3  12.3 - 15.4 % Final    RDW-SD 05/10/2023 51.5  37.0 - 54.0 fl Final    MPV 05/10/2023 11.6  6.0 - 12.0 fL Final    Platelets 05/10/2023 222  140 - 450 10*3/mm3 Final    Glucose 05/10/2023 97  65 - 99 mg/dL Final    BUN 05/10/2023 6  6 - 20 mg/dL Final    Creatinine 05/10/2023 0.76  0.57 - 1.00 mg/dL Final    Sodium 05/10/2023 141  136 - 145 mmol/L Final    Potassium 05/10/2023 3.8  3.5 - 5.2 mmol/L Final    Chloride 05/10/2023 105  98 - 107 mmol/L Final    CO2 05/10/2023 25.1  22.0 - 29.0 mmol/L Final    Calcium 05/10/2023 9.0  8.6 - 10.5 mg/dL Final    Total Protein 05/10/2023 6.1  6.0 - 8.5 g/dL Final    Albumin 05/10/2023 4.0  3.5 - 5.2 g/dL Final    ALT (SGPT) 05/10/2023 7  1 - 33 U/L Final    AST (SGOT) 05/10/2023 17  1 - 32 U/L Final    Alkaline Phosphatase 05/10/2023 61  39 - 117 U/L Final    Total Bilirubin 05/10/2023 0.7  0.0 - 1.2 mg/dL Final    Globulin 05/10/2023 2.1  gm/dL Final    A/G Ratio 05/10/2023 1.9  g/dL Final    BUN/Creatinine Ratio 05/10/2023 7.9  7.0 - 25.0 Final    Anion Gap 05/10/2023 10.9  5.0 - 15.0 mmol/L Final    eGFR 05/10/2023 102.4  >60.0 mL/min/1.73 Final      Labs 3/2023 Pancreatic elastase 133, calprotectin normal, C diff negative, GI panel negative.     EGD 6/14/2022 by Dr. Howell  - Normal oropharynx.  - Z-line irregular, 39 cm from the incisors.  - 2 cm hiatal hernia.  - Esophageal mucosal changes suspicious for long-segment Chatman's esophagus, classified as Chatman's stage C3-M4 per Garrett criteria. Biopsied.  - Erythematous mucosa in the  posterior wall of the stomach and antrum. Biopsied.  - Normal duodenal bulb, first portion of the duodenum, second portion of the duodenum and third portion of the duodenum. Biopsied.  - No endoscopic esophageal abnormality to explain patient's dysphagia. Biopsied.  Pathology DIAGNOSIS:   A.   DUODENUM, BIOPSY:   Small bowel mucosa with no significant pathologic abnormality   B.   ANTRUM AND BODY, BIOPSY:   Antral type gastric mucosa with reactive gastropathy   Body type gastric mucosa with no significant pathologic abnormality   No Helicobacter pylori like organisms identified on H&E stained slide   No intestinal metaplasia or dysplasia identified   C.   ESOPHAGUS, BIOPSY, LOWER:   Squamocolumnar junctional mucosa with intestinal metaplasia; findings   compatible with Chatman's esophagus   No evidence of dysplasia identified   D.   ESOPHAGUS, BIOPSY, DISTAL, MID AND PROXIMAL:   Squamous mucosa with mild reactive/reflux related changes   No increased intraepithelial eosinophils, intestinal metaplasia or   dysplasia identified      Colonoscopy 8/16/2022 by Dr. Howell  - Preparation of the colon was fair.  - Perianal skin tags found on perianal exam.  - One 3 to 4 mm polyp in the cecum, removed with a cold snare. Resected and retrieved.  - One 6 to 8 mm polyp in the proximal ascending colon, removed with a cold snare. Resected and retrieved.  - Non-bleeding internal/ external hemorrhoids.  - The examined portion of the ileum was normal. Biopsied.  - Biopsies were taken with a cold forceps from the right colon, left colon and transverse colon for evaluation of microscopic colitis.   Pathology DIAGNOSIS:   A.   TERMINAL ILEUM BIOPSY:   Ileal mucosa with no significant histopathologic abnormality   Negative for dysplasia or carcinoma  B.   COLON, BIOPSIES, LEFT, RIGHT, AND TRANSVERSE:   Colonic mucosa with no significant histopathologic abnormality   No evidence of dysplasia, carcinoma, or microscopic colitis   C.    CECUM POLYP:   Tubular adenoma   Negative for carcinoma or high-grade dysplasia   D.   ASCENDING COLON BIOPSY:   Fragments of tubular adenoma   Negative for carcinoma or high-grade dysplasia      pillcam 4/27/2023  Few healed inflammation scars noted in the jejunum and proximal ileum.  At least three active aphthae identified in the TI and few healing ulcerations and inflammatory scars in the distal ileum and TI noted.    Assessment / Plan      1. Crohn's disease of small intestine without complication  2. Folic acid deficiency  3. Diarrhea, unspecified type  4. LUQ abdominal pain  5. Exocrine pancreatic insufficiency  Since starting Creon when stool test abnormal for EPI, diarrhea improved. She still has diarrhea, bloating, belching, fecal urgency and abdominal pain but since taking budesonide taper, fecal urgency and stool frequency/form improved. Still with intermittent very dark stools. Lost 100 lbs over the past 5-6 years. Had labs by PCP and vitamin D and B12 were low, now with recent low folate did not start supplement. CTAP 2/2023 with contrast with mild stool burden, no other GI pathology. She took Xifaxan 550 mg TID x14 days for IBS-D without changes.      EGD 6/2022 showed EGD 6/2022 showed Chatman's esophagus, 2 cm hiatal hernia, patchy mild erythema in the stomach, normal duodenum. Pathology benign other than Chatman's. No celiac disease. Colonoscopy 8/2022 showed no endoscopic signs of colitis or ileitis. TI biopsy and random colon biopsies normal. Previous EGD 2015 showed food residue in the stomach and findings concerning for Chatman's (path negative). Colonoscopy 2015 showed focal areas of erythema in the terminal ileum (path normal), normal random colon biopsies and proctitis (path described ischemia or NSAID effect). Pillcam 4/2023 showed ulcers in the TI, healed inflammation in the proximal ileum and jejunum.      Since she had continued complaints with abnormal stools, weight loss and dark  stools intermittently, PillCam was completed and consistent with mild to moderate Crohn's disease of the small intestine. CRP was elevated at 0.51. Fecal calprotectin previously normal.      Continue Zofran PRN nausea  Continue low dose PPI daily  Low fiber diet when having more symptoms and continue to avoid salima  Small portions at meals  Continue Creon as directed  Continue Budesonide taper  Folic acid 400 mcg daily, buy over the counter  Call/msg with progress in 1-2 weeks after completing budesonide  Will determine next step in management, consider imuran monotherapy or biologics if symptoms persist  If sympotms improve, will consider repeat Pillcam in 1-2 years off medicine   Labs prior to next visit as ordered (CBC, CMP and folate)       Prior history:  Family history of esophageal cancer  Chatman's esophagus without dysplasia  Maternal uncle with esophageal cancer. EGD 6/2022 showed long segment (4 cm in length) Chatman's esophagus, 2 cm hiatal hernia, patchy mild erythema in the stomach, normal duodenum, no endoscopic esophageal abnormality to explain patient's dysphagia. Repeat EGD 2 years, due 6/2024.     This was an audio and video enabled telemedicine encounter.  Emily Tracey verbally consented to a telehealth visit. Emily was seen via telehealth using real-time video conferencing technology by Darcie Cabrales PA-C. Emily was located at work in her car, and Keron was located at [facility] [place], [place]. Emily and Keron participated in the telehealth visit. The telehealth visit started 1:00 PM and ended at 1:21 PM.     Follow Up:   Return in about 3 months (around 10/26/2023) for recheck Crohn's.    Darcie Cabrales PA-C  [facility]  7/26/2023  15:40 EDT    Dictated Utilizing Dragon Dictation: Part of this note may be an electronic transcription/translation of spoken language to printed text using the Dragon Dictation System.

## 2023-07-26 NOTE — PATIENT INSTRUCTIONS
Folic acid 400 mcg daily, buy over the counter  Message 1-2 weeks after completing budesonide with progress  Follow up 3 months with labs prior

## 2023-08-25 ENCOUNTER — TELEPHONE (OUTPATIENT)
Dept: SURGERY | Facility: CLINIC | Age: 38
End: 2023-08-25
Payer: MEDICAID

## 2023-10-18 ENCOUNTER — TELEPHONE (OUTPATIENT)
Dept: GASTROENTEROLOGY | Facility: CLINIC | Age: 38
End: 2023-10-18

## 2023-10-18 DIAGNOSIS — R10.84 DIFFUSE ABDOMINAL PAIN: Primary | ICD-10-CM

## 2023-10-18 RX ORDER — DICYCLOMINE HCL 20 MG
20 TABLET ORAL 3 TIMES DAILY PRN
Qty: 60 TABLET | Refills: 1 | Status: SHIPPED | OUTPATIENT
Start: 2023-10-18

## 2023-10-18 NOTE — TELEPHONE ENCOUNTER
Provider: RON/CARRIE    Caller: SHANNON ROBERTO    Relationship to Patient: SELF    Phone Number: [phone]     Reason for Call: OUT OF MEDICATION AND HAVING INCREASED LEFT SIDE PAIN. SHE WAS SEEN RECENTLY BY SHANNON BUTLER AND WAS REFERRED TO DR VELASQUEZ. MS. MEJIA HAS ISSUED HER CONCERN REGARDING TAKING HUMERIA AS SUGGESTED BY SHANNON BUTLER AND WOULD LIKE TO DISCUSS OTHER OPTIONS. MS. MEJIA STATES SHE HAS BEEN OUT OF ANY KIND OF MEDICATION AND WOULD LIKE SOMEONE TO CALL HER TO DISCUSS.     SHE STATES IT IS OK TO DISCUSS ON HER MY CHART OR  SCHEDULE HER A APPT WITH DR. VELASQUEZ. SHE IS UNABLE TO MAKE THE ONE SCHEDULED FOR THE 24TH DUE TO WORK.    When was the patient last seen: 7/26/23 MY CHART VIDEO    When did it start: ONGOING FOR SOME TIME.    Where is it located: LEFT SIDE PAIN     Timing- Is it constant or intermittent: CONSTANT

## 2023-10-20 ENCOUNTER — TELEPHONE (OUTPATIENT)
Dept: GASTROENTEROLOGY | Facility: CLINIC | Age: 38
End: 2023-10-20
Payer: MEDICAID

## 2023-10-20 NOTE — TELEPHONE ENCOUNTER
Spoke with pt and advised her of dicyclomine rx. She stated that her specific concern with Humira was its immunosuppression properties. She said that she was concerned because a sibling had recent passed away from an infection and this made her uncomfortable with this particular medication.

## 2023-10-24 ENCOUNTER — TELEPHONE (OUTPATIENT)
Dept: GASTROENTEROLOGY | Facility: CLINIC | Age: 38
End: 2023-10-24

## 2023-10-24 NOTE — TELEPHONE ENCOUNTER
CALLED PT AND LVM REGARDING SCHEDULING A FOLLOW UP APPT TO DISCUSS HER CONCERNS AND MANAGEMENT OPTIONS PER DR VELASQUEZ

## 2023-11-25 ENCOUNTER — APPOINTMENT (OUTPATIENT)
Dept: CT IMAGING | Facility: HOSPITAL | Age: 38
End: 2023-11-25
Payer: MEDICAID

## 2023-11-25 ENCOUNTER — HOSPITAL ENCOUNTER (EMERGENCY)
Facility: HOSPITAL | Age: 38
Discharge: HOME | End: 2023-11-25
Attending: EMERGENCY MEDICINE
Payer: MEDICAID

## 2023-11-25 VITALS
HEART RATE: 82 BPM | BODY MASS INDEX: 44.39 KG/M2 | OXYGEN SATURATION: 100 % | HEIGHT: 64 IN | DIASTOLIC BLOOD PRESSURE: 98 MMHG | SYSTOLIC BLOOD PRESSURE: 154 MMHG | TEMPERATURE: 97.9 F | WEIGHT: 260 LBS | RESPIRATION RATE: 18 BRPM

## 2023-11-25 DIAGNOSIS — S09.90XA INJURY OF HEAD, INITIAL ENCOUNTER: Primary | ICD-10-CM

## 2023-11-25 PROCEDURE — 99284 EMERGENCY DEPT VISIT MOD MDM: CPT

## 2023-11-25 PROCEDURE — 70450 CT HEAD/BRAIN W/O DYE: CPT

## 2023-11-25 RX ORDER — BUTALBITAL, ACETAMINOPHEN AND CAFFEINE 50; 325; 40 MG/1; MG/1; MG/1
1 TABLET ORAL EVERY 6 HOURS PRN
Qty: 12 TABLET | Refills: 0 | Status: SHIPPED | OUTPATIENT
Start: 2023-11-25

## 2023-11-25 RX ORDER — METOCLOPRAMIDE 10 MG/1
10 TABLET ORAL 3 TIMES DAILY PRN
Qty: 10 TABLET | Refills: 0 | Status: SHIPPED | OUTPATIENT
Start: 2023-11-25

## 2023-11-25 RX ORDER — ONDANSETRON 4 MG/1
4 TABLET, ORALLY DISINTEGRATING ORAL ONCE
Status: DISCONTINUED | OUTPATIENT
Start: 2023-11-25 | End: 2023-11-25 | Stop reason: HOSPADM

## 2023-11-25 NOTE — ED PROVIDER NOTES
TRIAGE CHIEF COMPLAINT:     Nursing and triage notes reviewed    Chief Complaint   Patient presents with    Head Injury      HPI: Emily Tracey is a 38 y.o. female who presents to the emergency department complaining of a head injury.  Patient states that 2 days previously a door fell over and hit her in the back of the head.  She states that she has been having pain since that time but her symptoms have worsened.  She has been very nauseated with some dizziness.  She denies losing consciousness when the injury occurred.  She states there was some bleeding from her head.    REVIEW OF SYSTEMS: All other systems reviewed and are negative     PAST MEDICAL HISTORY:   Past Medical History:   Diagnosis Date    Abdominal pain     Anxiety     Asthma     Backache     Blood in stool     Colon polyp     COVID-19 vaccine series completed     with booster    Crohn disease     Depression 2001    Diverticulitis of colon     Edema     Endometriosis     GERD (gastroesophageal reflux disease)     Kidney infection     Low back pain     LUQ pain     Migraine headache     Ovarian cyst     Pain and swelling of right lower leg     Panic attacks     Sacroiliac joint pain     Suspicious nevus     Tattoo     Urgency of urination     Vaginitis     Wears glasses         FAMILY HISTORY:   Family History   Problem Relation Age of Onset    Cancer Mother     COPD Mother     Heart attack Father     Esophageal cancer Maternal Uncle     Heart attack Other         Grandparent    Arthritis Other         Grandparent    Cancer Other         Grandparent    Diabetes Other         Grandparent    Migraines Other         Grandparent    Osteoporosis Other     Colon cancer Neg Hx         SOCIAL HISTORY:   Social History     Socioeconomic History    Marital status:    Tobacco Use    Smoking status: Former     Types: Cigarettes     Quit date: 1/1/2013     Years since quitting: 10.9    Smokeless tobacco: Never   Vaping Use    Vaping Use: Never used    Substance and Sexual Activity    Alcohol use: Yes     Alcohol/week: 1.0 standard drink of alcohol     Types: 1 Glasses of wine per week     Comment: occas.    Drug use: No    Sexual activity: Defer        SURGICAL HISTORY:   Past Surgical History:   Procedure Laterality Date    COLONOSCOPY      COLONOSCOPY N/A 08/16/2022    Procedure: COLONOSCOPY CPT CODE: 13440;  Surgeon: Johnna Howell MD;  Location: [facility] ENDOSCOPY;  Service: Gastroenterology;  Laterality: N/A;    ENDOSCOPY N/A 06/14/2022    Procedure: ESOPHAGOGASTRODUODENOSCOPY WITH BIOPSY;  Surgeon: Johnna Howell MD;  Location: [facility] ENDOSCOPY;  Service: Gastroenterology;  Laterality: N/A;    OTHER SURGICAL HISTORY  2001    Laparoscopic excision uterine surface endometriotic tissue x2    UPPER GASTROINTESTINAL ENDOSCOPY      WISDOM TOOTH EXTRACTION          CURRENT MEDICATIONS:      Medication List        ASK your doctor about these medications      albuterol sulfate  (90 Base) MCG/ACT inhaler  Commonly known as: PROVENTIL HFA;VENTOLIN HFA;PROAIR HFA     cloNIDine 0.1 MG tablet  Commonly known as: CATAPRES     Creon 78037-674290 units capsule delayed-release particles capsule  Generic drug: Pancrelipase (Lip-Prot-Amyl)  Take 2 caps with meals and 1 with snacks.     cyclobenzaprine 10 MG tablet  Commonly known as: FLEXERIL  Take 1 tablet by mouth 3 (Three) Times a Day As Needed for Muscle Spasms.     dicyclomine 20 MG tablet  Commonly known as: BENTYL  Take 1 tablet by mouth 3 (Three) Times a Day As Needed for Abdominal Cramping.     famotidine 40 MG tablet  Commonly known as: PEPCID  TAKE 1 TABLET BY MOUTH DAILY AS NEEDED FOR HEARTBURN (CAN TAKE UP TO 2 TIMES DAILY AS NEEDED)     fluticasone-salmeterol 500-50 MCG/DOSE DISKUS  Commonly known as: ADVAIR     folic acid 400 MCG tablet  Commonly known as: FOLVITE  Take 1 tablet by mouth Daily.     ipratropium-albuterol 0.5-2.5 mg/3 ml nebulizer  Commonly known as: DUO-NEB     lidocaine 5  % ointment  Commonly known as: XYLOCAINE  Apply 1 application topically to the appropriate area as directed Every 2 (Two) Hours As Needed for Mild Pain.     Lidocaine-Hydrocort (Perianal) 3-0.5 % cream rectal cream  Insert 1 application into the rectum 2 (Two) Times a Day As Needed (rectal pain).     montelukast 10 MG tablet  Commonly known as: SINGULAIR     Ointment Base ointment  Nifedipine ointment 0.3%. Apply small amount to rectum 2-3 times per day     ondansetron ODT 4 MG disintegrating tablet  Commonly known as: ZOFRAN-ODT  PLACE 1 TABLET ON THE TONGUE EVERY 8 (EIGHT) HOURS AS NEEDED FOR NAUSEA OR VOMITING.     pantoprazole 20 MG EC tablet  Commonly known as: PROTONIX  Take 1 tablet by mouth Daily.     vitamin D 1.25 MG (29723 UT) capsule capsule  Commonly known as: ERGOCALCIFEROL               ALLERGIES: Hydroxyzine and Lamotrigine     PHYSICAL EXAM:   VITAL SIGNS:   Vitals:    11/25/23 0109   BP: 154/98   Pulse: 82   Resp: 18   Temp: 97.9 °F (36.6 °C)   SpO2: 100%      CONSTITUTIONAL: Awake, oriented, appears nontoxic   HENT: Small abrasion on the posterior lateral right scalp without deep laceration, oral mucosa pink and moist, airway patent. Nares patent without drainage. External ears normal.   EYES: Conjunctivae clear   NECK: Trachea midline   CARDIOVASCULAR: Normal heart rate, Normal rhythm, No murmurs, rubs, gallops   PULMONARY/CHEST: Clear to auscultation, no rhonchi, wheezes, or rales. Symmetrical breath sounds.  ABDOMINAL: Nondistended, soft, nontender - no rebound or guarding.  NEUROLOGIC: Nonfocal, moving all four extremities, no gross sensory or motor deficits.   EXTREMITIES: No clubbing, cyanosis, or edema   SKIN: Warm, Dry, No erythema, No rash     ED COURSE / MEDICAL DECISION MAKING:   Emily Tracey is a 38 y.o. female who presents to the emergency department for evaluation of a head injury.  Patient is nondistressed on arrival emergency department.  Vital signs are stable.  Physical exam  does reveal an abrasion on the scalp but no deep laceration.    Differential diagnosis includes concussion, fracture, intracranial bleeding among other etiologies.    CT scan of the head was ordered for further evaluation of the patient's presentation.    Diagnostic information from other sources: Family, chart review    Interventions: Zofran    Narrative: Patient presents with a head injury.  CT scan of the head per radiology interpretation does not reveal obvious acute intracranial injury.  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  Discussed this with patient and we will refer to neurology for further evaluation.      DECISION TO DISCHARGE/ADMIT: see ED care timeline     FINAL IMPRESSION:   1 --head injury  2 --   3 --     Electronically signed by: Agnes Cuello MD, 11/25/2023 02:00 Agnes Soria MD  11/25/23 0347

## 2024-01-05 ENCOUNTER — TELEPHONE (OUTPATIENT)
Dept: GASTROENTEROLOGY | Facility: CLINIC | Age: 39
End: 2024-01-05
Payer: COMMERCIAL

## (undated) DEVICE — CONMED SCOPE SAVER BITE BLOCK, 20X27 MM: Brand: SCOPE SAVER

## (undated) DEVICE — ENDOSCOPY PORT CONNECTOR FOR OLYMPUS® SCOPES: Brand: ERBE

## (undated) DEVICE — Device

## (undated) DEVICE — LUBE JELLY PK/2.75GM STRL BX/144

## (undated) DEVICE — HYBRID TUBING/CAP SET FOR OLYMPUS® SCOPES: Brand: ERBE

## (undated) DEVICE — SUCTION CANISTER, 1500CC, RIGID: Brand: DEROYAL

## (undated) DEVICE — VLV SXN AIR/H2O ORCAPOD3 1P/U STRL

## (undated) DEVICE — FRCP BX RADJAW4 NDL 2.8 240 STD OG